# Patient Record
Sex: FEMALE | Race: WHITE | Employment: OTHER | URBAN - METROPOLITAN AREA
[De-identification: names, ages, dates, MRNs, and addresses within clinical notes are randomized per-mention and may not be internally consistent; named-entity substitution may affect disease eponyms.]

---

## 2017-04-26 ENCOUNTER — OFFICE VISIT (OUTPATIENT)
Dept: ORTHOPEDIC SURGERY | Age: 62
End: 2017-04-26

## 2017-04-26 VITALS
SYSTOLIC BLOOD PRESSURE: 121 MMHG | HEIGHT: 64 IN | HEART RATE: 77 BPM | DIASTOLIC BLOOD PRESSURE: 65 MMHG | WEIGHT: 145 LBS | BODY MASS INDEX: 24.75 KG/M2

## 2017-04-26 DIAGNOSIS — M76.822 TIBIAL TENDONITIS, POSTERIOR, LEFT: Primary | ICD-10-CM

## 2017-04-26 DIAGNOSIS — M79.672 CHRONIC PAIN OF LEFT HEEL: ICD-10-CM

## 2017-04-26 DIAGNOSIS — M79.671 PAIN IN BOTH FEET: ICD-10-CM

## 2017-04-26 DIAGNOSIS — G89.29 CHRONIC PAIN OF LEFT HEEL: ICD-10-CM

## 2017-04-26 DIAGNOSIS — M79.672 PAIN IN BOTH FEET: ICD-10-CM

## 2017-04-26 RX ORDER — ESCITALOPRAM OXALATE 10 MG/1
10 TABLET ORAL DAILY
COMMUNITY

## 2017-04-26 RX ORDER — DRONABINOL 5 MG/1
5 CAPSULE ORAL 2 TIMES DAILY
COMMUNITY

## 2017-04-26 RX ORDER — BUDESONIDE 3 MG/1
CAPSULE, COATED PELLETS ORAL
COMMUNITY
Start: 2017-03-02

## 2017-04-26 NOTE — PROGRESS NOTES
AMBULATORY PROGRESS NOTE    Patient: Bethany Michele             MRN: [id number]     SSN: [id number] There is no height or weight on file to calculate BMI. YOB: 1955     AGE: 63 y.o. EX: female    PCP: Mayi Mitchell MD    IMPRESSION/DIAGNOSIS AND TREATMENT PLAN     DIAGNOSES  No diagnosis found. No orders of the defined types were placed in this encounter. Bethany Michele understands her diagnoses and the proposed plan. Plan:    1) ***  2) ***    RTO - ***    HPI AND EXAMINATION     Gisela Eubanks IS A 63 y.o. female who presents to my outpatient office complaining of: left foot pain. At last visit, I recommend that she continue activities as tolerated. ***    She was previously seen s/p reconstruction on her left hindfoot. Her chief complaint today is:     1. Some soreness to the anteromedial portion of her right ankle. 2. Some generalized soreness to the plantar medial portion of her left hindfoot.      After seeing her today, my impression is:     1. Anteromedial, right focal discomfort along the medial malleolus of her right ankle: Recommendation is to follow up in the next few weeks. X-rays of her right ankle should her symptoms worsen. I did recommend an anti-inflammatory, Voltaren Cream, to be applied in this one focal region on the right side. 2. Left central heel pain, plantar heel discomfort: Recommendation is to continue the orthotic and activity modification. She cannot take anti-inflammatories at this time.      On examination, the patient is alert and follows commands. She is in no acute distress. Her affect and mood are appropriate. The right foot and ankle areas are examined. She has some focal tenderness to the anteromedial portion of the medial malleolus near the gutter. There is no effusion, no redness, no erythema, and no instability of her ankle. Excellent peripheral pulses are noted. She walks without a limp at this current time.  The discomfort she has is intermittent along the anteromedial portion of her ankle, medial gutter, when she plantarflexes and everts her foot. (So, when she puts tension on her deltoid ligament, this is when she has some mild discomfort.)     Left foot: She has well-healed surgical scars medially and laterally and slight discomfort to the plantar medial portion of her hindfoot along the medial calcaneal wall, not at the plantar fascial origin. Otherwise, no other regions of point tenderness, no deformities, and good peripheral pulses are noted. She has well-healed surgical scars. CHART REVIEW     Past Medical History:   Diagnosis Date    Appetite loss     Cancer [facility]) 2001    Hx of Right breast cancer    Chronic pain     Timo legs    Insomnia     Left foot pain Oct 2013    left flexor hallucis longus tendonitits    Migraines     Nausea     Osteopenia     Other ill-defined conditions(799.89)     very anxious    Posterior tibial tendonitis 2007    left, with interstitial tear; s/p repair     Current Outpatient Prescriptions   Medication Sig    amitriptyline (ELAVIL) 25 mg tablet Take 1 Tab by mouth daily as needed.  diclofenac (VOLTAREN) 1 % topical gel Apply 4 g to affected area four (4) times daily.  SUMAtriptan (IMITREX) 50 mg tablet Take 50 mg by mouth once as needed. Indications: MIGRAINE    multivitamin (ONE A DAY) tablet Take 1 Tab by mouth daily.  B.infantis-B.ani-B.long-B.bifi (PROBIOTIC 4X) 10-15 mg Tab Take  by mouth.  cyanocobalamin (VITAMIN B-12) 1,000 mcg Subl 3,000 mcg by SubLINGual route.  Cholecalciferol, Vitamin D3, (VITAMIN D3) 1,000 unit cap Take 1,000 Units by mouth daily.  raloxifene (EVISTA) 60 mg tablet Take 60 mg by mouth daily. Indications: POST-MENOPAUSAL OSTEOPOROSIS PREVENTION    melatonin 3 mg tablet Take 3 mg by mouth nightly. No current facility-administered medications for this visit.       Allergies   Allergen Reactions    Codeine Swelling    Percocet [Oxycodone-Acetaminophen] Swelling    Sulfa (Sulfonamide Antibiotics) Nausea Only    Ultram [Tramadol] Nausea and Vomiting    Vicodin [Hydrocodone-Acetaminophen] Swelling     Past Surgical History:   Procedure Laterality Date    BREAST SURGERY PROCEDURE UNLISTED      Right partial mastectomy    HX CHOLECYSTECTOMY  6/2012    HX COLONOSCOPY      multiple    HX OOPHORECTOMY      HX ORTHOPAEDIC Left Aug 2007    posterior tibial tendon debridement and repair, calcaneal osteotomy    HX ORTHOPAEDIC  June 2008    removal of hardware, left heel     Social History     Occupational History    Not on file. Social History Main Topics    Smoking status: Never Smoker    Smokeless tobacco: Never Used    Alcohol use No      Comment: rarely    Drug use: No    Sexual activity: Not on file     Family History   Problem Relation Age of Onset    Arthritis-osteo Mother     Hypertension Mother      REVIEW OF SYSTEMS : 4/26/2017  ALL BELOW ARE Negative except : SEE HPI       Constitutional: Negative for fever, chills and weight loss. Neg Weigh Loss  Cardiovascular: Negative for chest pain, claudication and leg swelling. SOB, DING   Gastrointestinal: Negative for  pain, N/V/D/C, Blood in stool or urine,dysuria, hematuria,        Incontinence, pelvic pain  Musculoskeletal: see HPI. Neurological: Negative for dizziness and weakness. Negative for headaches,Visual Changes, Confusion, Seizures,   Psychiatric/Behavioral: Negative for depression, memory loss and substance abuse. Extremities:  Negative for  hair changes, rash or skin lesion changes. Hematologic: Negative for Bleeding problems, bruising, pallor or swollen lymph nodes.   Peripheral Vascular: No calf pain, vascular vein tenderness to calf pain              No calf throbbing, posterior knee throbbing pain    DIAGNOSTIC IMAGING     XR Results (most recent):    Results from Hospital Encounter encounter on 10/04/16   XR CHEST PA LAT   Narrative Chest PA and lateral views    CPT CODE: 56424    HISTORY:Breast cancer     COMPARISON: None. FINDINGS: The heart is normal in size. The lungs are clear. The bilateral  costophrenic angles are sharp. The mediastinum, pulmonary vascularity and bony  thorax appear unremarkable. Surgical staples noted in the right breast area. Impression IMPRESSION:    No acute cardiopulmonary process.     Thank you for your referral.        Scribed by Marcos Lira, as dictated by Dayana Pérez MD.

## 2017-04-26 NOTE — MR AVS SNAPSHOT
Visit Information Date & Time Provider Department Dept. Phone Encounter #  
 4/26/2017  4:00 PM Shashileidy Woodsoy, [address] [facility] [id number] [phone] Follow-up Instructions Return in about 6 weeks (around 6/7/2017) for follow up evaluation. Upcoming Health Maintenance Date Due Hepatitis C Screening 1955 Pneumococcal 19-64 Highest Risk (1 of 3 - PCV13) 4/30/1976 DTaP/Tdap/Td series (1 - Tdap) 4/30/1978 BREAST CANCER SCRN MAMMOGRAM 4/30/2007 FOBT Q 1 YEAR AGE 50-75 4/30/2007 INFLUENZA AGE 9 TO ADULT 8/1/2016 PAP AKA CERVICAL CYTOLOGY 7/1/2017 Allergies as of 4/26/2017  Review Complete On: 4/26/2017 By: Asuncion Bailey PA-C Severity Noted Reaction Type Reactions Codeine  10/18/2012    Swelling Percocet [Oxycodone-acetaminophen]  10/18/2012    Swelling Sulfa (Sulfonamide Antibiotics)    Nausea Only Ultram [Tramadol]  10/18/2012    Nausea and Vomiting Vicodin [Hydrocodone-acetaminophen]  10/18/2012    Swelling Current Immunizations  Never Reviewed No immunizations on file. Not reviewed this visit You Were Diagnosed With   
  
 Codes Comments Plantar fasciitis    -  Primary ICD-10-CM: M72.2 ICD-9-CM: 728.71 Tibial tendonitis, posterior, left     ICD-10-CM: Y54.875 ICD-9-CM: 726.72 Vitals BP Pulse Height(growth percentile) Weight(growth percentile) LMP BMI  
 121/65 77 5' 4\" (1.626 m) 145 lb (65.8 kg) 04/18/2002 24.89 kg/m2 OB Status Smoking Status Postmenopausal Never Smoker Vitals History BMI and BSA Data Body Mass Index Body Surface Area  
 24.89 kg/m 2 1.72 m 2 Preferred Pharmacy Pharmacy Name Phone [facility] Renaldo Muniz, [facility] [place], [place] [address] [phone] Your Updated Medication List  
  
   
This list is accurate as of: 4/26/17  5:01 PM.  Always use your most recent med list.  
  
  
  
 amitriptyline 25 mg tablet Commonly known as:  ELAVIL Take 1 Tab by mouth daily as needed. budesonide 3 mg capsule Commonly known as:  ENTOCORT EC  
  
 diclofenac 1 % Gel Commonly known as:  VOLTAREN Apply 4 g to affected area four (4) times daily. dronabinol 5 mg capsule Commonly known as:  Tim Alpha Take 5 mg by mouth two (2) times a day. EVISTA 60 mg tablet Generic drug:  raloxifene Take 60 mg by mouth daily. Indications: POST-MENOPAUSAL OSTEOPOROSIS PREVENTION  
  
 IMITREX 50 mg tablet Generic drug:  SUMAtriptan Take 50 mg by mouth once as needed. Indications: MIGRAINE  
  
 LEXAPRO 10 mg tablet Generic drug:  escitalopram oxalate Take 10 mg by mouth daily. melatonin 3 mg tablet Take 3 mg by mouth nightly. multivitamin tablet Commonly known as:  ONE A DAY Take 1 Tab by mouth daily. PROBIOTIC 4X 10-15 mg Tbec Generic drug:  B.infantis-B.ani-B.long-B.bifi Take  by mouth. VITAMIN B-12 1,000 mcg sublingual tablet Generic drug:  cyanocobalamin  
3,000 mcg by SubLINGual route. VITAMIN D3 1,000 unit Cap Generic drug:  cholecalciferol Take 1,000 Units by mouth daily. We Performed the Following AMB SUPPLY ORDER [[id number] Custom] Comments:  
 4/26/2017 
4:47 PM 
 
Custom trilam with met pad- bilateral 
Offload central heel- left Follow-up Instructions Return in about 6 weeks (around 6/7/2017) for follow up evaluation. Please provide this summary of care documentation to your next provider. Your primary care clinician is listed as Donna Figueroa. If you have any questions after today's visit, please call [phone].

## 2017-04-26 NOTE — PROGRESS NOTES
Patient: Jermaine Syed                MRN: [id number]       SSN: [id number]  YOB: 1955               AGE: 63 y.o. SEX: female    Anita Ferguson MD      Chief Complaint:   Chief Complaint   Patient presents with    Foot Pain     Left         HPI:     Patient is a 63 y.o. female who presents today for follow up evaluation of bilateral foot pain. Patient states she has right forefoot neuropathy and denies a history of DM. She has a history of left posterior tibial reconstruction and calcaneal osteotomy and left central heel pain. She has been wearing orthotics (with met pad) that she received from [place] [id number] and would like an order for a new pair, but she did not like the black material used by [facility] as she states it causes a bad odor. Patient was last seen in the office by Dr. Ana Paula Flores on 1/5/16 and was instructed to continue activity modification, use Voltaren gel as directed as needed and follow up in 4 weeks. PHYSICAL EXAM:       Visit Vitals    /65    Pulse 77    Ht 5' 4\" (1.626 m)    Wt 145 lb (65.8 kg)    LMP 04/18/2002    BMI 24.89 kg/m2       GEN:  Alert, well developed, well nourished, well appearing 63 y.o. female in no acute distress. PSYCH:  Normal affect, mood, and conduct. alert, oriented x 3 alert, oriented x 3, no dementia  M/S EXAMINATION OF: left foot/ankle and right foot/ankle  SKIN: no edema, no erythema, no ecchymosis, no warmth. Left foot:  She has well-healed surgical scars medially and laterally and slight discomfort to the plantar medial portion of her hindfoot along the medial calcaneal wall, not at the plantar fascial origin. Otherwise, no other regions of point tenderness, no deformities, and good peripheral pulses are noted. She has well-healed surgical scars. TENDERNESS:  no tenderness to palpation   NEUROVASCULAR:  grossly intact. Positive distal pulses and capillary refill.    DVT ASSESSMENT:  The calf is not tender to palpation. No evidence of DVT seen on physical exam.  ROM:  Within normal limits with exception of toe flexion which is decreased      IMPRESSION:       Encounter Diagnoses     ICD-10-CM ICD-9-CM   1. Tibial tendonitis, posterior, left M76.822 726.72   2. Pain in both feet M79.671 729.5    M79.672    3. Chronic pain of left heel M79.672 729.5    G89.29 338.29       PLAN:         Orders Placed This Encounter    Generic Supply Order             · Continue activity as tolerated  · Order provided for new custom orthotics  · Follow up in 6 weeks or sooner as needed  · X-[facility] at [facility]      Patient has been discussed with Dr. Rosie Morales during this visit and he agrees with the assessment and plan. Patient understands treatment plan and has been provided with patient education. PAST MEDICAL HISTORY:     Past Medical History:   Diagnosis Date    Appetite loss     Cancer [facility]) 2001    Hx of Right breast cancer    Chronic pain     Timo legs    Insomnia     Left foot pain Oct 2013    left flexor hallucis longus tendonitits    Migraines     Nausea     Osteopenia     Other ill-defined conditions     very anxious    Posterior tibial tendonitis 2007    left, with interstitial tear; s/p repair       MEDICATIONS:     Current Outpatient Prescriptions   Medication Sig    escitalopram oxalate (LEXAPRO) 10 mg tablet Take 10 mg by mouth daily.  budesonide (ENTOCORT EC) 3 mg capsule     dronabinol (MARINOL) 5 mg capsule Take 5 mg by mouth two (2) times a day.  amitriptyline (ELAVIL) 25 mg tablet Take 1 Tab by mouth daily as needed.  diclofenac (VOLTAREN) 1 % topical gel Apply 4 g to affected area four (4) times daily.  SUMAtriptan (IMITREX) 50 mg tablet Take 50 mg by mouth once as needed. Indications: MIGRAINE    multivitamin (ONE A DAY) tablet Take 1 Tab by mouth daily.  cyanocobalamin (VITAMIN B-12) 1,000 mcg Subl 3,000 mcg by SubLINGual route.       Cholecalciferol, Vitamin D3, (VITAMIN D3) 1,000 unit cap Take 1,000 Units by mouth daily.  raloxifene (EVISTA) 60 mg tablet Take 60 mg by mouth daily. Indications: POST-MENOPAUSAL OSTEOPOROSIS PREVENTION    melatonin 3 mg tablet Take 3 mg by mouth nightly.  B.infantis-B.ani-B.long-B.bifi (PROBIOTIC 4X) 10-15 mg Tab Take  by mouth. No current facility-administered medications for this visit. ALLERGIES:     Allergies   Allergen Reactions    Codeine Swelling    Percocet [Oxycodone-Acetaminophen] Swelling    Sulfa (Sulfonamide Antibiotics) Nausea Only    Ultram [Tramadol] Nausea and Vomiting    Vicodin [Hydrocodone-Acetaminophen] Swelling         PAST SURGICAL HISTORY:     Past Surgical History:   Procedure Laterality Date    BREAST SURGERY PROCEDURE UNLISTED      Right partial mastectomy    HX CHOLECYSTECTOMY  6/2012    HX COLONOSCOPY      multiple    HX OOPHORECTOMY      HX ORTHOPAEDIC Left Aug 2007    posterior tibial tendon debridement and repair, calcaneal osteotomy    HX ORTHOPAEDIC  June 2008    removal of hardware, left heel       SOCIAL HISTORY:     Social History     Social History    Marital status: SINGLE     Spouse name: N/A    Number of children: N/A    Years of education: N/A     Occupational History    Not on file. Social History Main Topics    Smoking status: Never Smoker    Smokeless tobacco: Never Used    Alcohol use No      Comment: rarely    Drug use: No    Sexual activity: Not on file     Other Topics Concern    Not on file     Social History Narrative       FAMILY HISTORY:     Family History   Problem Relation Age of Onset    Arthritis-osteo Mother     Hypertension Mother          REVIEW OF SYSTEMS:     Otherwise as noted in HPI     REVIEW OF SYSTEMS: All Below are Negative except: See HPI      RADIOGRAPHS & DIAGNOSTIC STUDIES     No results found for any visits on 04/26/17.       Yung Collado PA-C  4/26/2017

## 2017-05-23 ENCOUNTER — TELEPHONE (OUTPATIENT)
Dept: ORTHOPEDIC SURGERY | Age: 62
End: 2017-05-23

## 2017-05-23 NOTE — TELEPHONE ENCOUNTER
The patient states [facility] will not accept the prescription for an orthotic with Evan Benavides PA-C signature. They require a new prescription to be faxed to their facility with Dr. Ezra Laguerre signature. Please advise.     [address] [address] [address]  (D) [phone]  (P) [phone]

## 2017-05-24 NOTE — TELEPHONE ENCOUNTER
New order entered with Dr. Garry Walter as the provider.   Order faxed to [facility] at [phone]

## 2017-06-19 ENCOUNTER — HOSPITAL ENCOUNTER (OUTPATIENT)
Dept: NUTRITION | Age: 62
Discharge: HOME | End: 2017-06-19
Payer: COMMERCIAL

## 2017-06-19 PROCEDURE — 97802 MEDICAL NUTRITION INDIV IN: CPT

## 2017-06-19 NOTE — PROGRESS NOTES
[address] [facility]  [address], [place], [address] - Phone: [phone]     Nutrition Assessment - Medical Nutrition Therapy   Outpatient  Initial Evaluation         Patient Name: Sofiya Edge : 1955   Treatment Diagnosis: Sucrose malabsorption Onset Date:    Referral Source: Melvin Whitehead MD [place] [facility]): 2017     Ht:   in  cm Wt:   lb  kg   BMI:   BMR male    BMR female      Diagnosis: See Above        Medications of Nutritional Significance:   Marinol, budesonide, colestipol. Elviil, Lexapro, evista, bentyl     Subjective/Assessment: Pt is a 62 yo female who has a history of breast CA, Ulcerative Colitis, and has developed a sucrose malabsorption. Pt has a limited appetite and is on an appetite stimulant (Marinol). Pt is a former  and is now retired. She is allergic to seafood as well to include fish and does not eat red meat or soy. Pt has active symptoms and is not sure where to start to get relief. Discussed and revied the low/no sucrose diet as well as the FODMAP. Decided to start with the Low/no Sucrose diet and will follow that for 6 weeks and then begin reintroducing foods back into diet. Discussed the purpose of elimination diets and the benefits from them. Discussed that pt maintains a food log so she has reference to what she eats to determine foods that elicit a negative or positive response. Provided her with recipes, and snack ideas as well as stores to find certain products. Pt will come back in ~6 weeks for follow up and to determine level of relief of symptoms. She is very motivated and willing to try anything to not feel as restricted as she currently does. Pt expressed comprehension, high motivation, and compliance is expected.        Current Eating Patterns:  0830: fiber bar w/ coffee /2 & /2 and splenda  1400: 2 tangerines, grapes, grits and slice of soto  0616: chix salad and jovana  1900: baked chix, steamed osvaldo, rice     Handouts/  Information Provided: []  Carbohydrates  []  Protein  []  Fiber  []  Serving Sizes  []  Meal and Snack Ideas  []  Food Journals []  Diabetes  []  Cholesterol  []  Sodium  []  Gen Nutr Guidelines  []  SBGM Guidelines  [x]  Others: no sucrose diet, FODMAP, recipes,    Estimate Needs:   Calories:  N/A Protein: 0 Carbs: 0 Fat: 0   Kcal/day  g/day  g/day  g/day         percent: 38 Percent: 30 Percent: 32     Nutritional Goal - To promote lifestyle changes to result in:    []  weight loss  []  Improved diabetic control  []  Decreased cholesterol levels  []  Decreased blood pressure  []  weight maintenance [x]  Preventing any interactions associated with food allergies  []  Adequate weight gain toward goal weight  []  Other:          Recommendations: 1. Eat 3 meals per day w/ 1-2 snacks  2. Follow a low/no sucrose diet for 6 week; then slowly re-introduce foods  3.  Maintain a food log to reference trigger and non-trigger foods     Information Reviewed with: patient   Potential Barriers to Learning: None     Dietitian Signature: Yung Schultz MA RD Date: 6/19/2017   Follow-up: 7-28-17 @ 1300 Time: 2:30 PM

## 2017-07-28 ENCOUNTER — HOSPITAL ENCOUNTER (OUTPATIENT)
Dept: NUTRITION | Age: 62
Discharge: HOME | End: 2017-07-28
Payer: COMMERCIAL

## 2017-07-28 PROCEDURE — 97803 MED NUTRITION INDIV SUBSEQ: CPT

## 2017-07-28 NOTE — PROGRESS NOTES
NUTRITION - DAILY TREATMENT NOTE  Patient Name: Alie Ceballos         Date: 2017  : 1955    YES Patient  Verified  Insurance: Payor: BLUE CROSS / Plan: [place] / Product Type: PPO /    Diagnosis: In time:   1300             Out time:   1330   Total Treatment Time (min):   30     SUBJECTIVE    Medication Changes/New allergies or changes in medical history, any new surgeries or procedures? NO    If yes, update Summary List   Subjective Functional Status/Changes:  []  No changes reported   Pt has been doing very well with following low/no sucrose diet. She has been very compliant and has eliminated all foods that contain sucrose. Pt has also started tracking her food intake on the [employer] hung and noticed she does not consume enough protein. She says she has not has any constipation, bloating, or cramping since being on diet, she feels much better is cancelling plans significantly less often. She did have 2 episodes of cramping since her last visit, but she was able to rule uout the cause and avoid those foods. Will hold of on the FODMAP diet at this time since pt is having success with the low/no sucrose diet. Pt will begin reintroducing foods next week and she understands the process and that is can be lengthy. She will maintain a journal of foods she tolerates, moderately tolerates, and must avoid. Pt expressed comprehension, high motivation, and compliance is expected.    Current Wt: 142 Previous Wt:  Wt Change:      OBJECTIVE    Patient Education:  [x]  Review current plan with patient   []  Other:    Handouts/  Information Provided: []  Carbohydrates  []  Protein  []  Fiber  []  Serving Sizes  []  Fluids  []  General guidelines []  Diabetes  []  Cholesterol  []  Sodium  []  SBGM  []  Food Journals  []  Others:    Estimated Needs: [phone]    Calories Protein CHO Fat     ASSESSMENT    [x]  Pt Progressing Well  []  Slow Progress []  No Progress    Other:    [x] Understand Dietary Changes/Recs []  No Change [x]  Improving []  N/A   []  Weight []  No Change []  Improving []  N/A     Glucose Levels []  No Change []  Improving []  N/A   []  Cholesterol Levels []  No Change []  Improving []  N/A     RECOMMENDATIONS    1. Start reintroducing foods on 8/1, start with one food at a time and only do one food per week   2. Create food lists based off tolerance of reintroduced foods   3. Increase protein intake, space your eating by 3 hours,    4.    5.       PLAN    [x]  Continue on current plan []  Follow-up PRN   []  Discharge due to :    [x]  Next Appt[de-identified] 9-13-17 @ 3212     Dietitian: Yayo Baez MA, RD    Date: 7/28/2017 Time: 12:44 PM

## 2017-09-29 ENCOUNTER — HOSPITAL ENCOUNTER (OUTPATIENT)
Dept: NUTRITION | Age: 62
Discharge: HOME | End: 2017-09-29
Payer: COMMERCIAL

## 2017-09-29 PROCEDURE — 97803 MED NUTRITION INDIV SUBSEQ: CPT

## 2017-09-29 NOTE — PROGRESS NOTES
NUTRITION - DAILY TREATMENT NOTE  Patient Name: Lodema Schwab         Date: 2017  : 1955    YES Patient  Verified  Insurance: Payor: BLUE CROSS / Plan: [place] [employer] / Product Type: PPO /    Diagnosis: In time:   1330             Out time:   1400   Total Treatment Time (min):   30     SUBJECTIVE    Medication Changes/New allergies or changes in medical history, any new surgeries or procedures? NO    If yes, update Summary List   Subjective Functional Status/Changes:  []  No changes reported   Pt has been doing great. She says she feels great and has no to very little symptoms at all. She sometimes has a little diarrhea, but attributes that to possible indulgence. She asked very insightful questions regarding increased bruising (most likely from budesonide), what labs she should have checked next month, other names for Sucrose, and what she should stay away from and what she can eat. Discussed that she should stick to what is working since she has resolution of her symptoms and to be cautious of foods she is unsure of, but it is okay to try them to richar her tolerance, so long as they do not contain sucrose. Pt has been eating clean with not added sugar foods. She is not drinking any carbonated beverages either. She is hoping to be able to travel more since she feels she has better control and understanding of her diet. Pt is very compliant and a pleasure to work with. Will continue to follow as long as she see benefit and would like.     Current Wt: 137.4 Previous Wt: 142 Wt Change: -4.6     OBJECTIVE    Patient Education:  [x]  Review current plan with patient   []  Other:    Handouts/  Information Provided: []  Carbohydrates  []  Protein  []  Fiber  []  Serving Sizes  []  Fluids  []  General guidelines []  Diabetes  []  Cholesterol  []  Sodium  []  SBGM  []  Food Journals  []  Others:    Estimated Needs: [phone]    Calories Protein CHO Fat     ASSESSMENT    [x]  Pt Progressing Well  []  Slow Progress []  No Progress    Other:    [x]  Understand Dietary Changes/Recs []  No Change [x]  Improving []  N/A   [x]  Weight []  No Change [x]  Improving []  N/A     Glucose Levels []  No Change []  Improving []  N/A   []  Cholesterol Levels []  No Change []  Improving []  N/A     RECOMMENDATIONS    1. Increase your protein intake; get 3-5 oz with meals and 1-2 oz with snacks (can also use protein powder)   2. Get more exercsie on non-stas days, exercise videos, other gym classes, etc   3. Flash intake of questionable foods by how you feel    4.    5.       PLAN    [x]  Continue on current plan []  Follow-up PRN   []  Discharge due to :    [x]  Next Appt[de-identified] 13 Nov @ [id number] Jevon Pope     Dietitian: Nara Nowak MA, RD    Date: 9/29/2017 Time: 1:17 PM

## 2017-11-13 ENCOUNTER — HOSPITAL ENCOUNTER (OUTPATIENT)
Dept: NUTRITION | Age: 62
Discharge: HOME | End: 2017-11-13
Payer: COMMERCIAL

## 2017-11-13 PROCEDURE — 97803 MED NUTRITION INDIV SUBSEQ: CPT

## 2017-11-13 NOTE — PROGRESS NOTES
NUTRITION - FOLLOW-UP TREATMENT NOTE  Patient Name: Blair Bagley         Date: 2017  : 1955    YES Patient  Verified  Diagnosis: Sucrose Malabsorption   In time:   1100             Out time:   1200   Total Treatment Time (min):   60     SUBJECTIVE/ASSESSMENT    Changes in medication or medical history? Any new allergies, surgeries or procedures? NO    If yes, update Summary List   Pt has been doing great with lifestyle changes. She has had very minimal side effects, some cramping or bloating and a loose stool here and there. Discussed how much sugar she should have each day, explained that like any elimination diet she will have to test her limits by slowly increasing amount she consumes. Provided parameters to stay within in order to not elicit a full on flare up. Answered all other questions the Pt had, alternative ingredients, exercise resources, also discussed ensuring she eats enough calories each day. Reinforced not consuming a lot of multi-blended foods (foods with multiple ingredients) in order to determine what food is causing a reaction. Pt will try whey protein with just water to ensure she has not tolerance issues, if so discussed vegan (non-whey protein options). Will continue to follow up with Pt at [facility] location. Current Wt: 134 Previous Wt: 137.4 Wt Change: -3.4     Achievement of Goals: 1. Increase your protein intake; get 3-5 oz with meals and 1-2 oz with snacks (can also use protein powder)  =  continue   2. Get more exercsie on non-stas days, exercise videos, other gym classes, etc = Met, continue   3.  Lisa Starling intake of questionable foods by how you feel  = Met, continue            Patient Education:  [x]  Review current plan with patient   []  Other:    Handouts/  Information Provided: []  Carbohydrates  []  Protein  []  Fiber  []  Serving Sizes  []  Fluids  []  General guidelines []  Diabetes  []  Cholesterol  []  Sodium  []  SBGM  [] Food Journals  []  Others: Alternative ingredients     New Patient Goals: - Start testing foods for tolerance level; minor gas is okay, cramping is not  - Try whey protein mixed with water to determine tolerance.  If no issues try premier protein shakes  - Make sure you are eating enough calories, do not go below 1200 daily     PLAN    [x]  Continue on current plan []  Follow-up PRN   []  Discharge due to :    [x]  Next appt: 19 Feb 2018 @ 21  @ [facility]     Dietitian: Ale Cerda, [email or web address]    Date: 11/13/2017 Time: 10:57 AM

## 2018-02-19 ENCOUNTER — HOSPITAL ENCOUNTER (OUTPATIENT)
Dept: NUTRITION | Age: 63
Discharge: HOME | End: 2018-02-19
Payer: COMMERCIAL

## 2018-02-19 PROCEDURE — 97803 MED NUTRITION INDIV SUBSEQ: CPT

## 2018-02-19 NOTE — PROGRESS NOTES
NUTRITION - FOLLOW-UP TREATMENT NOTE  Patient Name: Pablo Jones         Date: 2018  : 1955    YES Patient  Verified  Diagnosis: Sucrose malabsorption   In time:   1030             Out time:   1100   Total Treatment Time (min):   30     SUBJECTIVE/ASSESSMENT    Changes in medication or medical history? Any new allergies, surgeries or procedures? NO    If yes, update Summary List   Pt has been doing very good since last visit. She is stable and has learned the foods she needs to eliminate and those that she can have in small amounts. She has been following a clean eating style diet and ensuring she does not exceed 8 gm of sugar per day. She has slowly started trying different foods to determine tolerance and has good success. She is very aware of what works and what does not. She was able to eliminate Omega-3 supplement due to her reactions (loose stools). She is also very happy with her eating habits, as well as her energy level. She does eat small portions and needs to eat more frequently in order to maintain her weight. She has lost a total of 14# since starting to be seen in  of last year and discussed maintaining current weight and not going lower than 125#. She does not feel lethargic or malnourished so weight loss is most likely due to GI issues and the resolution, anticipate weight should taper and remain steady in 120's. She continues to do [place] 2x/week, she has been doing well on reduced Budesonide dose. Discussed more products she can try as well as reinforced her need for increased protein and calories. Pt completely understands and remains very motivated to maintain lifestyle changes. Will continue to follow post maternity leave. Current Wt: 128 Previous Wt: 134 Wt Change: -6     Achievement of Goals: - Start testing foods for tolerance level; minor gas is okay, cramping is not= Met, continue  - Try whey protein mixed with water to determine tolerance.  If no issues try premier protein shakes= Met, revised below  - Make sure you are eating enough calories, do not go below 1200 daily = Not met, revised below         Patient Education:  [x]  Review current plan with patient   []  Other:    Handouts/  Information Provided: []  Carbohydrates  []  Protein  []  Fiber  []  Serving Sizes  []  Fluids  []  General guidelines []  Diabetes  []  Cholesterol  []  Sodium  []  SBGM  []  Food Journals  []  Others:      New Patient Goals: - Start Incorporating one protein drink per day into your diet, dr. Kang Duarte or other protein mix  -Continue shifting focus on protein, try to get it from food sources as much as possible  -When you have small, snack size meals make sure you are eating every 2 hours  -calorie goal is no less than [id number] philip     PLAN    [x]  Continue on current plan []  Follow-up PRN   []  Discharge due to :    [x]  Next appt: 20 June @ [address] [place] [address]     Dietitian: Isai Lim MA, RD    Date: 2/19/2018 Time: 10:01 AM

## 2018-03-10 ENCOUNTER — HOSPITAL ENCOUNTER (OUTPATIENT)
Dept: ULTRASOUND IMAGING | Age: 63
Discharge: HOME | End: 2018-03-10
Attending: FAMILY MEDICINE
Payer: COMMERCIAL

## 2018-03-10 DIAGNOSIS — R10.31 RLQ ABDOMINAL PAIN: ICD-10-CM

## 2018-03-10 PROCEDURE — 76705 ECHO EXAM OF ABDOMEN: CPT

## 2018-03-27 ENCOUNTER — HOSPITAL ENCOUNTER (OUTPATIENT)
Dept: CT IMAGING | Age: 63
Discharge: HOME | End: 2018-03-27
Attending: NURSE PRACTITIONER
Payer: COMMERCIAL

## 2018-03-27 DIAGNOSIS — N20.0 KIDNEY STONE: ICD-10-CM

## 2018-03-27 PROCEDURE — 74176 CT ABD & PELVIS W/O CONTRAST: CPT

## 2018-03-28 NOTE — PROGRESS NOTES
Call and spoke with patient, reviewed results with her. She is aware of the bilateral kidney stones. They are not causing any problems at this time. Patient will follow up with her GI doctor.

## 2018-04-10 ENCOUNTER — HOSPITAL ENCOUNTER (OUTPATIENT)
Dept: GENERAL RADIOLOGY | Age: 63
Discharge: HOME | End: 2018-04-10
Payer: COMMERCIAL

## 2018-04-10 DIAGNOSIS — M25.551 RIGHT HIP PAIN: ICD-10-CM

## 2018-04-10 PROCEDURE — 73502 X-RAY EXAM HIP UNI 2-3 VIEWS: CPT

## 2018-06-20 ENCOUNTER — HOSPITAL ENCOUNTER (OUTPATIENT)
Dept: NUTRITION | Age: 63
Discharge: HOME | End: 2018-06-20
Payer: COMMERCIAL

## 2018-06-20 PROCEDURE — 97803 MED NUTRITION INDIV SUBSEQ: CPT

## 2018-06-20 NOTE — PROGRESS NOTES
NUTRITION - FOLLOW-UP TREATMENT NOTE  Patient Name: Kamaljit Altamirano         Date: 2018  : 1955    YES Patient  Verified  Diagnosis: Sucrose malabsorption   In time:   1300             Out time:   1330   Total Treatment Time (min):   30     SUBJECTIVE/ASSESSMENT    Changes in medication or medical history? Any new allergies, surgeries or procedures?    /NO    If yes, update Summary List   Pt has been okay since last visit in February. She has taken liberties with her diet and has learned that she may need to remain on sugar free (sucrose) for life. She is okay with this. She also notes that she has had no appetite for 6 weeks, could be attributed to the heat and possible acceptance of dietary lifestyle. She continues appetite stimulant 3-4 times per day and it takes the edge off to get her to eat. Anticipate she is consuming less than 100 calories per day. She also admits she stopped tracking her food intake. Discussed restarting her food log,and making sure she gets no less than 1200 calories per day. Discussed using nutrient dense foods (healthy fats) because these require less portion with more calorie. Discussed increasing protein intake and various products she can try (Ripple milk) to mix with smoothies as well as different ways to prepare protein foods so she doesn't get flavor fatigue. Also reiterated focusing on the positive of \"what can I have\" and not what she can't. Will continue to work with patient. She still remains motivated and positive.     Current Wt: 130.2 Previous Wt: 128 Wt Change: +2.2     Achievement of Goals: - Start Incorporating one protein drink per day into your diet, dr. Reina Jenkins or other protein mix-  = Not met, revised below  -Continue shifting focus on protein, try to get it from food sources as much as possible= Met, continue  -When you have small, snack size meals make sure you are eating every 2 hours= Met, continue  -calorie goal is no less than [id number] philip -  = Not met, revised below= Met, continue         Patient Education:  [x]  Review current plan with patient   []  Other:    Handouts/  Information Provided: []  Carbohydrates  []  Protein  []  Fiber  []  Serving Sizes  []  Fluids  []  General guidelines []  Diabetes  []  Cholesterol  []  Sodium  []  SBGM  []  Food Journals  []  Others:      New Patient Goals: - Get back to maintaining a food journal to guardado sure you are eating enough  - have no less than 1200 philip/day; keep sugar limits under control (4-6gm) don't push it  - Get enough protein prevents atrophy and look at increasing healthy fats for high calorie without overwhelming portions  - get back to having shakes (protein)     PLAN    [x]  Continue on current plan []  Follow-up PRN   []  Discharge due to :    [x]  Next appt: Aug 29 @ [facility]     Dietitian: Loren Milner MA, RD    Date: 6/20/2018 Time: 12:56 PM

## 2018-06-27 ENCOUNTER — OFFICE VISIT (OUTPATIENT)
Dept: ORTHOPEDIC SURGERY | Age: 63
End: 2018-06-27

## 2018-06-27 VITALS
RESPIRATION RATE: 16 BRPM | DIASTOLIC BLOOD PRESSURE: 70 MMHG | OXYGEN SATURATION: 93 % | HEIGHT: 64 IN | TEMPERATURE: 96.8 F | BODY MASS INDEX: 22.71 KG/M2 | SYSTOLIC BLOOD PRESSURE: 128 MMHG | HEART RATE: 85 BPM | WEIGHT: 133 LBS

## 2018-06-27 DIAGNOSIS — M19.072 ARTHRITIS OF LEFT FOOT: Primary | ICD-10-CM

## 2018-06-27 DIAGNOSIS — M76.822 TIBIAL TENDONITIS, POSTERIOR, LEFT: ICD-10-CM

## 2018-06-27 DIAGNOSIS — Z98.890 S/P TENDON REPAIR: ICD-10-CM

## 2018-06-27 RX ORDER — DICLOFENAC SODIUM 10 MG/G
GEL TOPICAL 2 TIMES DAILY
Qty: 100 G | Refills: 5 | Status: SHIPPED | OUTPATIENT
Start: 2018-06-27

## 2018-06-27 NOTE — PROGRESS NOTES
AMBULATORY PROGRESS NOTE      Patient: Velma Ochoa             MRN: [id number]     SSN: [id number] Body mass index is 22.83 kg/(m^2). YOB: 1955     AGE: 66 y.o. EX: female    PCP: Libby Naik MD    IMPRESSION/DIAGNOSIS AND TREATMENT PLAN     DIAGNOSES  1. Arthritis of left foot    2. S/P tendon repair    3. Tibial tendonitis, posterior, left        Orders Placed This Encounter    AMB SUPPLY ORDER    AMB SUPPLY ORDER    [89452] Foot Min 3V    diclofenac (VOLTAREN) 1 % gel      Velma Ochoa understands her diagnoses and the proposed plan. Plan:    1) DME Order: Custom Trilaminar orthotic inserts with medial posting. 2) Voltaren 1% gel: 4 g BID; 5 refills. RTO - 1 year    HPI AND EXAMINATION     Gisela Eubanks IS A 66 y.o. female who presents to my outpatient office for follow up evaluation of bilateral foot pain, and left posterior tibial tendinitis. At last visit, Yaron Crawford PA-C, recommended to continue activity as tolerated, and provided an order for new custom orthotics. Since we saw her last, Ms. Tessie Liu reports she has experienced left heel pain and AM left ankle pain. In the past, Ms. Tessie Liu had a posterior tibial tendon reconstruction. She has diminished sensation along the lateral aspect of her left ankle inferior to the lateral malleolus, but she has normal sensation of her lateral left foot. Of note, she still has diminished sensation along the fourth and fifth toes. Otherwise, she would like get new custom trilaminar inserts as she plans to go on a trip in the coming months. Additionally, she has felt clicking of her left knee. She denies any major pain or discomfort. Her main concern is to make sure that it is not something to be concerned of. 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 GEN:  Alert, well developed, well nourished, well appearing 63 y.o. female in no acute distress. PSYCH:  Normal affect, mood, and conduct.  alert, oriented x 3 alert, oriented x 3, no dementia  M/S EXAMINATION OF: left foot/ankle and right foot/ankle  SKIN: no edema, no erythema, no ecchymosis, no warmth. Left foot:  She has well-healed surgical scars medially and laterally and slight discomfort to the plantar medial portion of her hindfoot along the medial calcaneal wall, not at the plantar fascial origin. Otherwise, no other regions of point tenderness, no deformities, and good peripheral pulses are noted. She has well-healed surgical scars. TENDERNESS:  very slight tenderness to palpation at this time to reconstructed tib post region. NEUROVASCULAR:  grossly intact. Positive distal pulses and capillary refill. DVT ASSESSMENT:  The calf is not tender to palpation. No evidence of DVT seen on physical exam.  ROM:  Within normal limits with exception of toe flexion which is decreased    Knees:  Left       Cutaneous: Skin intact, no abrasions, blisters, wounds, erythema       Effusion: Is not present       Crepitus:  no PF joint crepitus       Tenderness: Tenderness: None        Alignment of Knee: neutral when standing       ROM: full range of motion       Fullness or swelling: None to popliteal fossa region       Stability: No instability to anterior, posterior, varus, valgus stress testing       Thrust: No varus thrust with gait       Neuro: Extensor mechanism is intact    CHART REVIEW     Past Medical History:   Diagnosis Date    Appetite loss     Cancer ([facility] [address] 2001    Hx of Right breast cancer    Chronic pain     Timo legs    Insomnia     Left foot pain Oct 2013    left flexor hallucis longus tendonitits    Migraines     Nausea     Osteopenia     Other ill-defined conditions(799.89)     very anxious    Posterior tibial tendonitis 2007    left, with interstitial tear; s/p repair     Current Outpatient Prescriptions   Medication Sig    LYSINE PO Take  by mouth.  diclofenac (VOLTAREN) 1 % gel Apply  to affected area two (2) times a day.  Apply to affected area as directed.  dicyclomine (BENTYL) 10 mg capsule     tobramycin-dexamethasone (TOBRADEX) ophthalmic suspension     escitalopram oxalate (LEXAPRO) 10 mg tablet Take 10 mg by mouth daily.  budesonide (ENTOCORT EC) 3 mg capsule     dronabinol (MARINOL) 5 mg capsule Take 5 mg by mouth two (2) times a day.  amitriptyline (ELAVIL) 25 mg tablet Take 1 Tab by mouth daily as needed.  diclofenac (VOLTAREN) 1 % topical gel Apply 4 g to affected area four (4) times daily.  SUMAtriptan (IMITREX) 50 mg tablet Take 50 mg by mouth once as needed. Indications: MIGRAINE    multivitamin (ONE A DAY) tablet Take 1 Tab by mouth daily.  cyanocobalamin (VITAMIN B-12) 1,000 mcg Subl 3,000 mcg by SubLINGual route.  Cholecalciferol, Vitamin D3, (VITAMIN D3) 1,000 unit cap Take 1,000 Units by mouth daily.  raloxifene (EVISTA) 60 mg tablet Take 60 mg by mouth daily. Indications: POST-MENOPAUSAL OSTEOPOROSIS PREVENTION    melatonin 3 mg tablet Take 3 mg by mouth nightly.  nitrofurantoin, macrocrystal-monohydrate, (MACROBID) 100 mg capsule     B.infantis-B.ani-B.long-B.bifi (PROBIOTIC 4X) 10-15 mg Tab Take  by mouth. No current facility-administered medications for this visit. Allergies   Allergen Reactions    Codeine Swelling    Percocet [Oxycodone-Acetaminophen] Swelling    Sulfa (Sulfonamide Antibiotics) Nausea Only    Ultram [Tramadol] Nausea and Vomiting    Vicodin [Hydrocodone-Acetaminophen] Swelling     Past Surgical History:   Procedure Laterality Date    BREAST SURGERY PROCEDURE UNLISTED      Right partial mastectomy    HX CHOLECYSTECTOMY  6/2012    HX COLONOSCOPY      multiple    HX OOPHORECTOMY      HX ORTHOPAEDIC Left Aug 2007    posterior tibial tendon debridement and repair, calcaneal osteotomy    HX ORTHOPAEDIC  June 2008    removal of hardware, left heel     Social History     Occupational History    Not on file.      Social History Main Topics    Smoking status: Never Smoker    Smokeless tobacco: Never Used    Alcohol use No      Comment: rarely    Drug use: No    Sexual activity: Not on file     Family History   Problem Relation Age of Onset    Arthritis-osteo Mother     Hypertension Mother        REVIEW OF SYSTEMS : 7/26/2018  ALL BELOW ARE Negative except : SEE HPI       Constitutional: Negative for fever, chills and weight loss. Neg Weigh Loss  Cardiovascular: Negative for chest pain, claudication and leg swelling. SOB, DING   Gastrointestinal: Negative for  pain, N/V/D/C, Blood in stool or urine,dysuria, hematuria,        Incontinence, pelvic pain  Musculoskeletal: see HPI. Neurological: Negative for dizziness and weakness. Negative for headaches,Visual Changes, Confusion, Seizures,   Psychiatric/Behavioral: Negative for depression, memory loss and substance abuse. Extremities:  Negative for  hair changes, rash or skin lesion changes. Hematologic: Negative for Bleeding problems, bruising, pallor or swollen lymph nodes. Peripheral Vascular: No calf pain, vascular vein tenderness to calf pain              No calf throbbing, posterior knee throbbing pain    DIAGNOSTIC IMAGING     FOOT X RAYS 3 VIEWS Left   7/26/2018    WEIGHT BEARING    X RAYS AT [address] [place] [address]  7/26/2018      Bones: No fractures or dislocations. No focal osteolytic or osteoblastic process     Bone Spurs: No significant bone spurs  Foot Alignment: Mild Pes Planus  Joint Condition:   MILD OA 1ST CC REGION// PRIOR CALCANEAL OSTEOTOMY OF CALCANEUS  Soft Tissues: Normal, No radiopaque foreign body and No abnormal calcific densities to soft tissues   No ankle joint effusion in lateral projection. Mineralization: Suggests  no Osteopenia    I have personally reviewed the results of the above study.  The interpretation of this study is my professional opinion    Written by Elis Flanagan, as dictated by Jaelyn Wise MD. I, , Jaelyn Wise MD, confirm that all documentation is accurate.

## 2018-06-27 NOTE — MR AVS SNAPSHOT
[address], [address] 200 [facility] 
[phone] Patient: Mervin Bain MRN: [id number] Visit Information Date & Time Provider Department Dept. Phone Encounter #  
 2018  3:45 PM Florentino Seip, 27 [facility] [phone] Upcoming Health Maintenance Date Due Hepatitis C Screening 1955 DTaP/Tdap/Td series (1 - Tdap) 1978 BREAST CANCER SCRN MAMMOGRAM 2007 FOBT Q 1 YEAR AGE 50-75 2007 ZOSTER VACCINE AGE 60> 2017 PAP AKA CERVICAL CYTOLOGY 2017 Influenza Age 5 to Adult 2018 Allergies as of 2018  Review Complete On: 2018 By: Maximino Grubbs Severity Noted Reaction Type Reactions Codeine  10/18/2012    Swelling Percocet [Oxycodone-acetaminophen]  10/18/2012    Swelling Sulfa (Sulfonamide Antibiotics)    Nausea Only Ultram [Tramadol]  10/18/2012    Nausea and Vomiting Vicodin [Hydrocodone-acetaminophen]  10/18/2012    Swelling Current Immunizations  Never Reviewed No immunizations on file. Not reviewed this visit You Were Diagnosed With   
  
 Codes Comments Arthritis of left foot    -  Primary ICD-10-CM: J73.007 ICD-9-CM: 716.97 S/P tendon repair     ICD-10-CM: X25.198 ICD-9-CM: V45.89 Vitals BP Pulse Temp Resp Height(growth percentile) Weight(growth percentile) 128/70 85 96.8 °F (36 °C) (Oral) 16 5' 4\" (1.626 m) 133 lb (60.3 kg) LMP SpO2 BMI OB Status Smoking Status 2002 93% 22.83 kg/m2 Postmenopausal Never Smoker BMI and BSA Data Body Mass Index Body Surface Area  
 22.83 kg/m 2 1.65 m 2 Preferred Pharmacy Pharmacy Name Phone [facility] - [place], [address] [phone] Your Updated Medication List  
  
   
 This list is accurate as of 6/27/18  6:06 PM.  Always use your most recent med list.  
  
  
  
  
 amitriptyline 25 mg tablet Commonly known as:  ELAVIL Take 1 Tab by mouth daily as needed. budesonide 3 mg capsule Commonly known as:  ENTOCORT EC  
  
 diclofenac 1 % Gel Commonly known as:  VOLTAREN Apply 4 g to affected area four (4) times daily. dicyclomine 10 mg capsule Commonly known as:  BENTYL  
  
 dronabinol 5 mg capsule Commonly known as:  Ivett Sic Take 5 mg by mouth two (2) times a day. EVISTA 60 mg tablet Generic drug:  raloxifene Take 60 mg by mouth daily. Indications: POST-MENOPAUSAL OSTEOPOROSIS PREVENTION  
  
 IMITREX 50 mg tablet Generic drug:  SUMAtriptan Take 50 mg by mouth once as needed. Indications: MIGRAINE  
  
 LEXAPRO 10 mg tablet Generic drug:  escitalopram oxalate Take 10 mg by mouth daily. LYSINE PO Take  by mouth.  
  
 melatonin 3 mg tablet Take 3 mg by mouth nightly. multivitamin tablet Commonly known as:  ONE A DAY Take 1 Tab by mouth daily. nitrofurantoin (macrocrystal-monohydrate) 100 mg capsule Commonly known as:  MACROBID  
  
 PROBIOTIC 4X 10-15 mg Tbec Generic drug:  B.infantis-B.ani-B.long-B.bifi Take  by mouth. tobramycin-dexamethasone ophthalmic suspension Commonly known as:  Chyrel Kitchen VITAMIN B-12 1,000 mcg sublingual tablet Generic drug:  cyanocobalamin  
3,000 mcg by SubLINGual route. VITAMIN D3 1,000 unit Cap Generic drug:  cholecalciferol Take 1,000 Units by mouth daily. We Performed the Following AMB POC XRAY, FOOT; COMPLETE, 3+ VIEW [10807 CPT(R)] AMB SUPPLY ORDER [[id number] Custom] Comments:  
 Custom Trilaminar orthotics inserts with medial posting To-Do List   
 08/29/2018 10:00 AM  
  Appointment with Aleida Kwon RD at [address] [place] [address] Patient Instructions Please follow up in 1 year. You are advised to contact us if your condition worsens. Posterior Tibial Tendinitis: Exercises Your Care Instructions Here are some examples of typical rehabilitation exercises for your condition. Start each exercise slowly. Ease off the exercise if you start to have pain. Your doctor or physical therapist will tell you when you can start these exercises and which ones will work best for you. How to do the exercises Calf wall stretch (back knee straight) 1. Stand facing a wall with your hands on the wall at about eye level. Put your affected leg about a step behind your other leg. 2. Keeping your back leg straight and your back heel on the floor, bend your front knee and gently bring your hip and chest toward the wall until you feel a stretch in the calf of your back leg. 3. Hold the stretch for at least 15 to 30 seconds. 4. Repeat 2 to 4 times. Calf wall stretch (knees bent) 1. Stand facing a wall with your hands on the wall at about eye level. Put your affected leg about a step behind your other leg. 2. Keeping both heels on the floor, bend both knees. Then gently bring your hip and chest toward the wall until you feel a stretch in the calf of your back leg. 3. Hold the stretch for at least 15 to 30 seconds. 4. Repeat 2 to 4 times. Hamstring wall stretch 1. Lie on your back in a doorway, with your good leg through the open door. 2. Slide your affected leg up the wall to straighten your knee. You should feel a gentle stretch down the back of your leg. 1. Do not arch your back. 2. Do not bend either knee. 3. Keep one heel touching the floor and the other heel touching the wall. Do not point your toes. 3. Hold the stretch for at least 1 minute to begin. Then over time, try to lengthen the time you hold the stretch to as long as 6 minutes. 4. Repeat 2 to 4 times.  
5. If you do not have a place to do this exercise in a doorway, there is another way to do it: 6. Lie on your back, and bend the knee of your affected leg. 7. Loop a towel under the ball and toes of that foot, and hold the ends of the towel in your hands. 8. Straighten your knee, and slowly pull back on the towel. You should feel a gentle stretch down the back of your leg. 9. Hold the stretch for 15 to 30 seconds. Or even better, hold the stretch for 1 minute if you can. 10. Repeat 2 to 4 times. Shin muscle stretch 1. Sit in a chair, with both feet flat on the floor. 2. Bend your affected leg behind you so that the top of your foot near your toes is flat on the floor and your toes are pointed away from your body. If you need to, you can hold on to the sides of the chair for support. 3. Hold the stretch for at least 15 to 30 seconds. You should feel a stretch in the front (shin) of your lower leg. 4. Repeat 2 to 4 times. Follow-up care is a key part of your treatment and safety. Be sure to make and go to all appointments, and call your doctor if you are having problems. It's also a good idea to know your test results and keep a list of the medicines you take. Where can you learn more? Go to http://pola-josiane.info/. Enter I459 in the search box to learn more about \"Posterior Tibial Tendinitis: Exercises. \" Current as of: March 21, 2017 Content Version: 11.4 © 0215-8949 Healthwise, Incorporated. Care instructions adapted under license by [employer] (which disclaims liability or warranty for this information). If you have questions about a medical condition or this instruction, always ask your healthcare professional. [place] [address] any warranty or liability for your use of this information. Introducing [facility]! [place] [employer] introduces [employer] [place] patient portal. Now you can access parts of your medical record, email your doctor's office, and request medication refills online. 1. In your internet browser, go to [email or web address] 2. Click on the First Time User? Click Here link in the Sign In box. You will see the New Member Sign Up page. 3. Enter your Giftxoxo Access Code exactly as it appears below. You will not need to use this code after youve completed the sign-up process. If you do not sign up before the expiration date, you must request a new code. · Giftxoxo Access Code: [email or web address] Expires: 9/24/2018  9:21 AM 
 
4. Enter the last four digits of your Social Security Number (xxxx) and Date of Birth (mm/dd/yyyy) as indicated and click Submit. You will be taken to the next sign-up page. 5. Create a I2 TELE[employer] INTERNATIONAt ID. This will be your Giftxoxo login ID and cannot be changed, so think of one that is secure and easy to remember. 6. Create a Giftxoxo password. You can change your password at any time. 7. Enter your Password Reset Question and Answer. This can be used at a later time if you forget your password. 8. Enter your e-mail address. You will receive e-mail notification when new information is available in [address] 9. Click Sign Up. You can now view and download portions of your medical record. 10. Click the Download Summary menu link to download a portable copy of your medical information. If you have questions, please visit the Frequently Asked Questions section of the Giftxoxo website. Remember, Giftxoxo is NOT to be used for urgent needs. For medical emergencies, dial 911. Now available from your iPhone and Android! Please provide this summary of care documentation to your next provider. Your primary care clinician is listed as Caroline Kunz. If you have any questions after today's visit, please call [phone].

## 2018-06-27 NOTE — PATIENT INSTRUCTIONS
Please follow up in 1 year. You are advised to contact us if your condition worsens. Posterior Tibial Tendinitis: Exercises  Your Care Instructions  Here are some examples of typical rehabilitation exercises for your condition. Start each exercise slowly. Ease off the exercise if you start to have pain. Your doctor or physical therapist will tell you when you can start these exercises and which ones will work best for you. How to do the exercises  Calf wall stretch (back knee straight)    1. Stand facing a wall with your hands on the wall at about eye level. Put your affected leg about a step behind your other leg. 2. Keeping your back leg straight and your back heel on the floor, bend your front knee and gently bring your hip and chest toward the wall until you feel a stretch in the calf of your back leg. 3. Hold the stretch for at least 15 to 30 seconds. 4. Repeat 2 to 4 times. Calf wall stretch (knees bent)    1. Stand facing a wall with your hands on the wall at about eye level. Put your affected leg about a step behind your other leg. 2. Keeping both heels on the floor, bend both knees. Then gently bring your hip and chest toward the wall until you feel a stretch in the calf of your back leg. 3. Hold the stretch for at least 15 to 30 seconds. 4. Repeat 2 to 4 times. Hamstring wall stretch    1. Lie on your back in a doorway, with your good leg through the open door. 2. Slide your affected leg up the wall to straighten your knee. You should feel a gentle stretch down the back of your leg. 1. Do not arch your back. 2. Do not bend either knee. 3. Keep one heel touching the floor and the other heel touching the wall. Do not point your toes. 3. Hold the stretch for at least 1 minute to begin. Then over time, try to lengthen the time you hold the stretch to as long as 6 minutes. 4. Repeat 2 to 4 times.   5. If you do not have a place to do this exercise in a doorway, there is another way to do it:  6. Lie on your back, and bend the knee of your affected leg. 7. Loop a towel under the ball and toes of that foot, and hold the ends of the towel in your hands. 8. Straighten your knee, and slowly pull back on the towel. You should feel a gentle stretch down the back of your leg. 9. Hold the stretch for 15 to 30 seconds. Or even better, hold the stretch for 1 minute if you can. 10. Repeat 2 to 4 times. Shin muscle stretch    1. Sit in a chair, with both feet flat on the floor. 2. Bend your affected leg behind you so that the top of your foot near your toes is flat on the floor and your toes are pointed away from your body. If you need to, you can hold on to the sides of the chair for support. 3. Hold the stretch for at least 15 to 30 seconds. You should feel a stretch in the front (shin) of your lower leg. 4. Repeat 2 to 4 times. Follow-up care is a key part of your treatment and safety. Be sure to make and go to all appointments, and call your doctor if you are having problems. It's also a good idea to know your test results and keep a list of the medicines you take. Where can you learn more? Go to http://pola-josiane.info/. Enter X454 in the search box to learn more about \"Posterior Tibial Tendinitis: Exercises. \"  Current as of: March 21, 2017  Content Version: 11.4  © 1197-1798 [employer]. Care instructions adapted under license by [employer]books (which disclaims liability or warranty for this information). If you have questions about a medical condition or this instruction, always ask your healthcare professional. [place] [address] any warranty or liability for your use of this information.

## 2018-06-29 ENCOUNTER — TELEPHONE (OUTPATIENT)
Dept: ORTHOPEDIC SURGERY | Age: 63
End: 2018-06-29

## 2018-06-29 NOTE — TELEPHONE ENCOUNTER
Pt called in stating the order for her orthotics needs to be changed to say that this is a worker's comp claim. The order she received on the 26th states that it is not worker's comp and that [employer] [facility] would not accept it. Please advise pt at [phone] when order has been updated.

## 2018-07-06 NOTE — TELEPHONE ENCOUNTER
Patient called in requesting to see if order has been corrected yet. She states as of 07/04/18 when she spoke with stanley it was not ready. Please advise patient at [phone].

## 2018-07-17 ENCOUNTER — TELEPHONE (OUTPATIENT)
Dept: ORTHOPEDIC SURGERY | Age: 63
End: 2018-07-17

## 2018-07-17 NOTE — TELEPHONE ENCOUNTER
[facility] [employer] called in states the order from 07/02/18 for Custom Trilaminar orthotics inserts with medial posting is a workers comp claim and that Dr. Gilford Jesus has to be the one to sign it not Lilyan Severs. Please Fax to [phone].

## 2018-07-26 NOTE — PROCEDURES
FOOT X RAYS 3 VIEWS Left   7/26/2018    WEIGHT BEARING    X RAYS AT [facility] [address] [place] [address]  7/26/2018      Bones: No fractures or dislocations. No focal osteolytic or osteoblastic process     Bone Spurs: No significant bone spurs  Foot Alignment: Mild Pes Planus  Joint Condition:   MILD OA 1ST CC REGION// PRIOR CALCANEAL OSTEOTOMY OF CALCANEUS  Soft Tissues: Normal, No radiopaque foreign body and No abnormal calcific densities to soft tissues   No ankle joint effusion in lateral projection. Mineralization: Suggests  no Osteopenia    I have personally reviewed the results of the above study.  The interpretation of this study is my professional opinion

## 2018-08-29 ENCOUNTER — HOSPITAL ENCOUNTER (OUTPATIENT)
Dept: NUTRITION | Age: 63
Discharge: HOME | End: 2018-08-29
Payer: COMMERCIAL

## 2018-08-29 PROCEDURE — 97803 MED NUTRITION INDIV SUBSEQ: CPT

## 2018-08-29 NOTE — PROGRESS NOTES
NUTRITION - FOLLOW-UP TREATMENT NOTE  Patient Name: Erich Muller         Date: 2018  : 1955    YES Patient  Verified  Diagnosis: Sucrose Malabsorption   In time:     1000          Out time:   1030   Total Treatment Time (min):   30     SUBJECTIVE/ASSESSMENT    Changes in medication or medical history? Any new allergies, surgeries or procedures? NO    If yes, update Summary List   Pt was doing well up until 3 weeks ago when mother had health issues. She has had increased stress which has negatively impacted her GI stability leaving her with multiple loose stools daily. She decided on Monday of this week (3 day ago) to increase her Budesonide to 2 doses per day and that has resolved her GI issues. She was also eating oranges and feels the acidity was not helping her condition. She has an appt with GI doctor in a week. She has been tracking food intake consistently and notes when she exercises she comes in below daily calorie goals. She has maintained her weight though, which is one of her goals. We discussed increasing protein intake and things to try, foods, recipes, products, etc. Pt is otherwise stable and her mother is back to good health, which reduces Patients stress. Pt is also going out of the country starting Sept 15 and would like to insure she remains stable for the cruise. Will continue to follow and assist pt in nutritional needs, she remains very compliant and motivated.     Current Wt: 130.4 Previous Wt: 130.2 Wt Change: 0     Achievement of Goals: - Get back to maintaining a food journal to guardado sure you are eating enough= continue  - have no less than 1200 philip/day; keep sugar limits under control (4-6gm) don't push it= continue  - Get enough protein prevents atrophy and look at increasing healthy fats for high calorie without overwhelming portions= continue  - get back to having shakes (protein) = Not met, revised below         Patient Education:  [x]  Review current plan with patient   []  Other:    Handouts/  Information Provided: []  Carbohydrates  []  Protein  []  Fiber  []  Serving Sizes  []  Fluids  []  General guidelines []  Diabetes  []  Cholesterol  []  Sodium  []  SBGM  []  Food Journals  [x]  Others: Vegetable list     New Patient Goals: - When having increased stool output avoid highly acidic foods  - Blend ground poultry and lean ground beef or use lean cuts of beef to increase protein as well as provide more variety with meat options  - try protein powder in ripple milk, Quest bar - higher calorie, high protein, low sugar  - increase vegetable intake  - focus on getting to calorie limit, keep tracking and eating enough     PLAN    [x]  Continue on current plan []  Follow-up PRN   []  Discharge due to :    [x]  Next appt: 13 Nov @ 1 [address]rea     Dietitian: Medhat Miller MA, RD    Date: 8/29/2018 Time: 10:06 AM

## 2018-11-13 ENCOUNTER — HOSPITAL ENCOUNTER (OUTPATIENT)
Dept: NUTRITION | Age: 63
Discharge: HOME | End: 2018-11-13
Payer: COMMERCIAL

## 2018-11-13 PROCEDURE — 97803 MED NUTRITION INDIV SUBSEQ: CPT

## 2018-11-13 NOTE — PROGRESS NOTES
NUTRITION - FOLLOW-UP TREATMENT NOTE  Patient Name: Oswald Longest         Date: 2018  : 1955    YES Patient  Verified  Diagnosis: Sucrose Malabsorption   In time:   1130            Out time:   1200   Total Treatment Time (min):   30     SUBJECTIVE/ASSESSMENT    Changes in medication or medical history? Any new allergies, surgeries or procedures? NO    If yes, update Summary List   Pt has been doing okay since last visit. Shewent on a cruise and did not follow her diet, but she did do very well and had no issues except constipation. She has gone back and forth with constipation and diarrhea since getting home. She admittedly has been lazy with her diet and inconsistent and knows/understands the importance of staying on a routine and being consistent. Also, discussed fluid requirements as well as eating well balanced meals that contain the appropriate amount of calories. She has been leaning on protein bars and other convenience foods to be a meal replacement. Discussed not doing this and getting back on track. Pt is agreeable and will be compliant. WIll follow-up in Feb at new location at [facility].    Current Wt: 135.8 Previous Wt: 130.2 Wt Change: +5     Achievement of Goals: -- When having increased stool output avoid highly acidic foods= Met, continue  - Blend ground poultry and lean ground beef or use lean cuts of beef to increase protein as well as provide more variety with meat options= Met, continue  - try protein powder in ripple milk, Quest bar - higher calorie, high protein, low sugar= Met, continue  - increase vegetable intake= Not met, revised below  - focus on getting to calorie limit, keep tracking and eating enough = Not met, revised below         Patient Education:  [x]  Review current plan with patient   []  Other:    Handouts/  Information Provided: []  Carbohydrates  []  Protein  []  Fiber  []  Serving Sizes  []  Fluids  []  General guidelines []  Diabetes  [] Cholesterol  []  Sodium  []  SBGM  []  Food Journals  []  Others:      New Patient Goals: - Get back to a consistent intake of foods you know you should be eating and avoiding those that you do not tolerate  -increase water intake to at least 64 oz per day (coffee does not count) space it out  - Eat more during the day with well balanced meals, get into a routine and structure.  Protein bars are not meals     PLAN    [x]  Continue on current plan []  Follow-up PRN   []  Discharge due to :    [x]  Next appt: 6 Feb @1000 @ [address] [facility]     Dietitian: Sravanthi Estes MA, RD    Date: 11/13/2018 Time: 11:22 AM

## 2019-02-06 ENCOUNTER — HOSPITAL ENCOUNTER (OUTPATIENT)
Dept: NUTRITION | Age: 64
Discharge: HOME | End: 2019-02-06
Payer: COMMERCIAL

## 2019-02-06 PROCEDURE — 97803 MED NUTRITION INDIV SUBSEQ: CPT

## 2019-02-06 NOTE — PROGRESS NOTES
NUTRITION - FOLLOW-UP TREATMENT NOTE  Patient Name: Li Antunez         Date: 2019  : 1955    YES Patient  Verified  Diagnosis: sucrose malabsorption   In time:   1000             Out time:   1030   Total Treatment Time (min):   30     SUBJECTIVE/ASSESSMENT    Changes in medication or medical history? Any new allergies, surgeries or procedures? NO    If yes, update Summary List   No changes at this time         Current Wt: 133 Previous Wt: 135.8 Wt Change: -2.8     Achievement of Goals: Pt is doing great. She is working on being more consistent with her eating. She admits she does better when she is with someone. She is learning that she needs to have her bigger meal earlier in the day to make sure she eats it. She knows to not use the protein bar as a meal and that it can be a snack to bridge the gap between meals. She is working on water intake getting close to 64 oz per day. She continues to do stas 2 times per week, and would like to look into doing it more often. Otherwise she has learned what food she needs to avoids and how to eat to best suit her lifestyle. Will continue to follow periodically to keep her accountable. Patient Education:  [x]  Review current plan with patient   []  Other:    Handouts/  Information Provided: []  Carbohydrates  []  Protein  []  Fiber  []  Serving Sizes  []  Fluids  []  General guidelines []  Diabetes  []  Cholesterol  []  Sodium  []  SBGM  []  Food Journals  []  Others:      New Patient Goals: - get larger meal consumed earlier in the day, remain consistent with eating habits, especially if you are by yourself.   - Look into other Stas classes offered other days of the week  - no coffee until after you have eaten (this is an appetite suppressant)      PLAN    [x]  Continue on current plan []  Follow-up PRN   []  Discharge due to :    [x]  Next appt:  @ [id number] [address]     Dietitian: Kaedem Mohan MA, RD    Date: 2019 Time: 9:56 AM

## 2019-06-18 ENCOUNTER — HOSPITAL ENCOUNTER (OUTPATIENT)
Dept: NUTRITION | Age: 64
Discharge: HOME | End: 2019-06-18
Payer: COMMERCIAL

## 2019-06-18 PROCEDURE — 97803 MED NUTRITION INDIV SUBSEQ: CPT

## 2019-06-19 NOTE — PROGRESS NOTES
NUTRITION - FOLLOW-UP TREATMENT NOTE  Patient Name: Dante Saldaña         Date: 2019  : 1955    YES Patient  Verified  Diagnosis: Sucrose malabsorption   In time:   1100             Out time:   1130   Total Treatment Time (min):   30     SUBJECTIVE/ASSESSMENT    Changes in medication or medical history? Any new allergies, surgeries or procedures? YES      If yes, update Summary List   Dose adjustments with meds during flare-up, but is now off lynzes and budesonide       Current Wt: 141.6 Previous Wt: 133 Wt Change: +8.6     Achievement of Goals: Pt is doing well, but has had some GI issues since last visit. She fully admits she has been allowing more sugars into her diet as well as she has been having more coffee than water. She was able to get her medication situated and has been able to remain off and stable for some time. She is on the upswing currently. She is upset that she has gained weight and she does not know how to get it off. Discussed how little she eats and that she needs to make her small meals more substantial and focus on getting more protein in. Provided examples and ideas. Pt will continue to come on current schedule of every 4 months. Compliance is expected.           Patient Education:  [x]  Review current plan with patient   []  Other:    Handouts/  Information Provided: []  Carbohydrates  []  Protein  []  Fiber  []  Serving Sizes  []  Fluids  []  General guidelines []  Diabetes  []  Cholesterol  []  Sodium  []  SBGM  []  Food Journals  []  Others:      New Patient Goals: - Get at least 64 oz of water daily; less coffee  - Make breakfast and dinner more substantial; shredded chicken and pork tenderloin in crock pot  - Back off sugar     PLAN    []  Continue on current plan []  Follow-up PRN   []  Discharge due to :    [x]  Next appt: 15 Oct @ [address] [address]     Dietitian: Cassidy Barrera MA, RD    Date: 2019 Time: 8:36 AM

## 2019-10-08 ENCOUNTER — HOSPITAL ENCOUNTER (OUTPATIENT)
Dept: NUTRITION | Age: 64
Discharge: HOME | End: 2019-10-08
Payer: COMMERCIAL

## 2019-10-08 PROCEDURE — 97803 MED NUTRITION INDIV SUBSEQ: CPT

## 2019-10-08 NOTE — PROGRESS NOTES
NUTRITION - FOLLOW-UP TREATMENT NOTE  Patient Name: Puja Nielson         Date: 10/8/2019  : 1955    YES Patient  Verified  Diagnosis: Sucrose malabsorption   In time:   1100             Out time:   1130   Total Treatment Time (min):   30     SUBJECTIVE/ASSESSMENT    Changes in medication or medical history? Any new allergies, surgeries or procedures? NO    If yes, update Summary List   No change         Current Wt: 137.8 Previous Wt: 141. 6 Wt Change: -3.8     Achievement of Goals: Pt has been feeling very well; outside of Bacterial infection with antibiotic use in September. She has had no dietary concerns or flare-ups. She is feeling well, losing some weight and generally feels like things have regulated. She has not been traveling as much and her routine has been consistent. She is going on a cruise in November and discussed this being a stressor. ALso discussed fiber sources, insuring she remain balanced with her diet and well nourished. Reviewed looking for sucrose in items and keeping her exercise consistent. will see patient again in February. She is doing [employer]          Patient Education:  [x]  Review current plan with patient   []  Other:    Handouts/  Information Provided: []  Carbohydrates  []  Protein  []  Fiber  []  Serving Sizes  []  Fluids  []  General guidelines []  Diabetes  []  Cholesterol  []  Sodium  []  SBGM  []  Food Journals  []  Others:      New Patient Goals: - Continue on track with diet and medication; keep sucrose to 5-6 gm per day, eat well balanced  - Be consistent with exercise, stas, walking, pinterest exercises at home  - Watch for sucrose in the ingredients list if sugar is listed on the label  - keep fiber between 25-35 gm/day (see handout on soluble vs insoluble)     PLAN    []  Continue on current plan []  Follow-up PRN   []  Discharge due to :    []  Next appt:  @ [id number]     Dietitian: Jens Moritz, MA, RD    Date: 10/8/2019 Time: 12:14 PM

## 2019-10-15 ENCOUNTER — APPOINTMENT (OUTPATIENT)
Dept: NUTRITION | Age: 64
End: 2019-10-15
Payer: COMMERCIAL

## 2020-02-25 ENCOUNTER — HOSPITAL ENCOUNTER (OUTPATIENT)
Dept: NUTRITION | Age: 65
Discharge: HOME | End: 2020-02-25
Payer: COMMERCIAL

## 2020-02-25 PROCEDURE — 97803 MED NUTRITION INDIV SUBSEQ: CPT

## 2020-02-25 NOTE — PROGRESS NOTES
NUTRITION - FOLLOW-UP TREATMENT NOTE  Patient Name: Arvin Berumen         Date: 2020  : 1955    YES Patient  Verified  Diagnosis: Sucrose malabsorption   In time:   1100             Out time:   1130   Total Treatment Time (min):   30     SUBJECTIVE/ASSESSMENT    Changes in medication or medical history? Any new allergies, surgeries or procedures? NO    If yes, update Summary List   No change         Current Wt: 137.8 Previous Wt: 141. 6 Wt Change: -3.8     Achievement of Goals: Pt has not been adhering to her diet as strictly as she usually does. Started with getting sick after last appointment, to not going on vacation, to the holidays, and now just needing help to get back on track. Discussed her need to cut back on sugar, and sugar alcohols/substitutes as well as soda. Also suggested she follow the FODMAP diet for 2 weeks to \"reset\" and get herself regular again with out having a massive evacuation for multiple days (pt has been constipated). She does better when she tracks what she eats as well, she has been consistent with going to gar[employer] 2 days per week and finding ways to increase her activity through day-to-day errands, appointments, projects, etc. She admits her water intake has not been very high and she is definitely leaning too heavily on protein bars and pre-made snacks and foods as she does not plan food ahead of time. Pt agreed to all and knows she needs to get back on track. See below for new goals.           Patient Education:  [x]  Review current plan with patient   []  Other:    Handouts/  Information Provided: []  Carbohydrates  []  Protein  []  Fiber  []  Serving Sizes  []  Fluids  []  General guidelines []  Diabetes  []  Cholesterol  []  Sodium  []  SBGM  []  Food Journals  []  Others:      New Patient Goals: -Increase water intake to 64-96 oz of water/fluids; especially on non-exercise days  - Cut sugar back out of diet; stop drinking soda and try naturally flavored sparkling water  -Make sure you are eating @ consistent times; prepare food ahead of time; whether it is a snack or a meal so you are less dependent on packaged foods  - Do a FODMAP reset for at least 2 weeks and combine with knowledge of foods you know you can tolerate. PLAN    []  Continue on current plan []  Follow-up PRN   []  Discharge due to :    []  Next appt:  May 7 @ [id number]     Dietitian: Angel Smith MA, RD    Date: 2/25/2020 Time: 12:14 PM

## 2020-05-07 ENCOUNTER — APPOINTMENT (OUTPATIENT)
Dept: NUTRITION | Age: 65
End: 2020-05-07

## 2020-07-28 ENCOUNTER — HOSPITAL ENCOUNTER (OUTPATIENT)
Dept: NUTRITION | Age: 65
Discharge: HOME | End: 2020-07-28
Payer: COMMERCIAL

## 2020-07-28 PROCEDURE — 97803 MED NUTRITION INDIV SUBSEQ: CPT

## 2020-07-28 NOTE — PROGRESS NOTES
NUTRITION  FOLLOW-UP TREATMENT NOTE  Patient Name: Sandrita Vann         Date: 2020  : 1955    YES Patient  Verified  Diagnosis: Sucrose malabsorption   In time:  0900            Out time:   0930   Total Treatment Time (min):   30     SUBJECTIVE/ASSESSMENT    Changes in medication or medical history? Any new allergies, surgeries or procedures? NO    If yes, update Summary List   No change         Current Wt: 147.2 Previous Wt: 137.8 Wt Change: +10     Achievement of Goals: Met with patient to day for follow-up visit. Pt admits she has gained some weight as she has not been able to exercise since her [employer] class has been postponed. She has been having GI inconsistencies (more constipation than anything). She admits it may be related to her diet and other habits. This has resolved recently and she has been consistent with her BMs. She Is due for a colonoscopy, discussed natural laxative foods she can try as well as answered all other related questions. Pt is concerned about weight gain, discussed things she can do at home as she is concerned she is at higher risk as well as her mother is high risk. She knows what she needs to do, but she needs to do it. Discussed backing off synthetic/procvessed foods and choosing more natural options for nutrients.           Patient Education:  [x]  Review current plan with patient   []  Other:    Handouts/  Information Provided: []  Carbohydrates  []  Protein  []  Fiber  []  Serving Sizes  []  Fluids  []  General guidelines []  Diabetes  []  Cholesterol  []  Sodium  []  SBGM  []  Food Journals  []  Others:      New Patient Goals: -Increase water intake to 64-96 oz of water/fluids; especially on non-exercise days  - Try pureed fruits (remember the Ps) to help with constipation and staying regular  - Use the handout on food high in Zinc as a reference to add more to your diet  - Youtube free wesley classes as well as check out [place], netflix and [employer] for other free workouts.        PLAN    [x]  Continue on current plan []  Follow-up PRN   []  Discharge due to :    [x]  Next appt: Call in one month to get on the schedule with new RD     Dietitian: Jami Finley MA, RD    Date: 7/28/2020 Time: 12:14 PM

## 2020-10-30 ENCOUNTER — TRANSCRIBE ORDER (OUTPATIENT)
Dept: SCHEDULING | Age: 65
End: 2020-10-30

## 2020-10-30 DIAGNOSIS — M85.9 DISORDER OF BONE DENSITY AND STRUCTURE, UNSPECIFIED: ICD-10-CM

## 2020-10-30 DIAGNOSIS — Z91.89 AT RISK FOR LOSS OF BONE DENSITY: Primary | ICD-10-CM

## 2021-09-28 NOTE — PROGRESS NOTES
[facility] AMBULATORY PROGRESS NOTE      Patient: Lino Delaney             MRN: [id number]     SSN: [id number] Body mass index is 24.89 kg/m². YOB: 1955     AGE: 61 y.o. EX: female    PCP: Tito Albarran MD       IMPRESSION //  DIAGNOSIS AND TREATMENT PLAN        Lino Delaney has a diagnosis of:      *Overall discomfort, has improved over the last 2 days. She has some mild tenderness to FHL plantar portion of her of her foot and a slight fullness over this FHL, possibly indicating a nodularity to the FHL tendon. Watch her closely for this I will give her a custom orthotic additional plans listed as below    DIAGNOSES    1. Posterior tibialis tendinitis of both lower extremities    2. Pes planus of both feet    3. Left foot pain        Orders Placed This Encounter    Generic Supply Order     Bilateral Custom trilaminar orthotic medially posted w/ met pad    [73591] Foot Min 3V     Order Specific Question:   Weight bearing? Answer:   No        PLAN:    1. Obtain 3-View XR left foot pain  2. DME: Custom trilaminar orthotic medially posted w/ met pad        RTO-  1 month    Lino Delaney  expresses understanding of the diagnosis, treatment plan, and all of their proposed questions were answered to their satisfaction. Patient education has been provided re the diagnoses. HPI //  [address] IS A 61 y.o. female who is a/an new patient, presenting to my outpatient office for evaluation of  the following chief complaint(s):     Chief Complaint   Patient presents with    Foot Pain     left foot     Patient presents today w/ left foot pain onset 9/11/2021. She states the foot hurts so bad that se could barely walk. She mentions  she  Initially had pain while wearing orthotics and then it progressed to her having pain while bare foot, but the pain has subsided.  She notes she rubbed a cream into her left arch, which has alleviated her left foot pain.    Visit Vitals  Pulse 89   Temp 96.9 °F (36.1 °C) (Temporal)   Resp 15   Ht 5' 4\" (1.626 m)   Wt 145 lb (65.8 kg)   LMP 04/18/2002   SpO2 94%   BMI 24.89 kg/m²       Appearance: Alert, well appearing and pleasant patient who is in no distress, oriented to person, place/time, and who follows commands. This patient is accompanied in the examination room by her  self. There is signs of: no dementia  Psychiatric: Affect/mood are appropriate. Speech normal in context and clarity, memory intact grossly, no involuntary movements - tremors. Patient arrives to office via: without assistive device:   H EENT (2): Head normocephalic & atraumatic. Eye: pupils are round// EOM are intact // Neck: ROM WNL  // Hearings Intact   Respiratory: Breathing non labored     ANKLE/FOOT left    Gait: normal  Tenderness: mild over  ATFL/FHL///mild tenderness of plantar portion of the FHL  Cutaneous:   WNL. Joint Motion:   WNL  Joint / Tendon Stability: No Ankle or Subtalar instability or joint laxity. No peroneal sublux ability or dislocation  Alignment: neutral Hindfoot,    Neuro Motor/Sensory: NL/NL  Vascular: NL foot/ankle pulses,   Lymphatics: No extremity lymphedema, No calf swelling, no tenderness to calf muscles. CHART REVIEW     Arline Jain has been experiencing pain and discomfort confirmed as outlined in the pain assessment outlined below.  was reviewed by Tyler Mendenhall MD on 10/4/2021.      Pain Assessment  10/4/2021   Location of Pain Foot   Location Modifiers Left   Severity of Pain 0   Quality of Pain Aching;Dull   Duration of Pain -   Frequency of Pain Intermittent   Date Pain First Started (No Data)   Date Pain First Started Comment 2 weeks ago   Aggravating Factors Bending;Stretching;Straightening;Walking   Limiting Behavior Some   Relieving Factors Exercises   Result of Injury -   Work-Related Injury -   Type of Injury -        Arline Jain  has a past medical history of Appetite loss, Cancer ([facility] [address] (2001), Chronic pain, Insomnia, Left foot pain (Oct 2013), Migraines, Nausea, Osteopenia, Other ill-defined conditions(799.89), and Posterior tibial tendonitis (2007). Patients is employed at:         Past Medical History:   Diagnosis Date    Appetite loss     Cancer [facility]) 2001    Hx of Right breast cancer    Chronic pain     Timo legs    Insomnia     Left foot pain Oct 2013    left flexor hallucis longus tendonitits    Migraines     Nausea     Osteopenia     Other ill-defined conditions(799.89)     very anxious    Posterior tibial tendonitis 2007    left, with interstitial tear; s/p repair     Past Surgical History:   Procedure Laterality Date    HX CHOLECYSTECTOMY  6/2012    HX COLONOSCOPY      multiple    HX OOPHORECTOMY      HX ORTHOPAEDIC Left Aug 2007    posterior tibial tendon debridement and repair, calcaneal osteotomy    HX ORTHOPAEDIC  June 2008    removal of hardware, left heel    PA BREAST SURGERY PROCEDURE UNLISTED      Right partial mastectomy     Current Outpatient Medications   Medication Sig    LYSINE PO Take  by mouth.  diclofenac (VOLTAREN) 1 % gel Apply  to affected area two (2) times a day. Apply to affected area as directed.  dicyclomine (BENTYL) 10 mg capsule     nitrofurantoin, macrocrystal-monohydrate, (MACROBID) 100 mg capsule     escitalopram oxalate (LEXAPRO) 10 mg tablet Take 10 mg by mouth daily.  dronabinol (MARINOL) 5 mg capsule Take 5 mg by mouth two (2) times a day.  amitriptyline (ELAVIL) 25 mg tablet Take 1 Tab by mouth daily as needed.  diclofenac (VOLTAREN) 1 % topical gel Apply 4 g to affected area four (4) times daily.  SUMAtriptan (IMITREX) 50 mg tablet Take 50 mg by mouth once as needed. Indications: MIGRAINE    multivitamin (ONE A DAY) tablet Take 1 Tab by mouth daily.  B.infantis-B.ani-B.long-B.bifi (PROBIOTIC 4X) 10-15 mg Tab Take  by mouth.       cyanocobalamin (VITAMIN B-12) 1,000 mcg Subl 3,000 mcg by SubLINGual route.  Cholecalciferol, Vitamin D3, (VITAMIN D3) 1,000 unit cap Take 1,000 Units by mouth daily.  raloxifene (EVISTA) 60 mg tablet Take 60 mg by mouth daily. Indications: POST-MENOPAUSAL OSTEOPOROSIS PREVENTION    melatonin 3 mg tablet Take 3 mg by mouth nightly.  tobramycin-dexamethasone (TOBRADEX) ophthalmic suspension  (Patient not taking: Reported on 10/4/2021)    budesonide (ENTOCORT EC) 3 mg capsule  (Patient not taking: Reported on 10/4/2021)     No current facility-administered medications for this visit. Allergies   Allergen Reactions    Codeine Swelling    Percocet [Oxycodone-Acetaminophen] Swelling    Sulfa (Sulfonamide Antibiotics) Nausea Only    Ultram [Tramadol] Nausea and Vomiting    Vicodin [Hydrocodone-Acetaminophen] Swelling     Social History     Occupational History    Not on file   Tobacco Use    Smoking status: Never Smoker    Smokeless tobacco: Never Used   Substance and Sexual Activity    Alcohol use: No     Comment: rarely    Drug use: No    Sexual activity: Not on file     Family History   Problem Relation Age of Onset    Arthritis-osteo Mother     Hypertension Mother         DIAGNOSTIC LAB DATA      No results found for: HBA1C, TIE7ZFZB, FFV7TQMJ //   Lab Results   Component Value Date/Time    Glucose 87 10/22/2009 09:18 AM    Glucose, POC 83 10/19/2012 12:58 PM        No results found for: FEP3ANBJ, XUA9HGZU      Lab Results   Component Value Date/Time    Vitamin D 25-Hydroxy 41 10/22/2009 09:18 AM         REVIEW OF SYSTEMS : 10/4/2021  ALL BELOW ARE Negative except : SEE HPI     All other systems reviewed and are negative. 12 point review of systems otherwise negative unless noted in HPI. DIAGNOSTIC IMAGING /ORDERS       Orders Placed This Encounter    Generic Supply Order     Bilateral Custom trilaminar orthotic medially posted w/ met pad    [26243] Foot Min 3V     Order Specific Question:   Weight bearing? Answer:   No        FOOT X RAYS 3 VIEWS LEFT  10/4/2021    NON WEIGHT BEARING    X RAYS AT [facility] [address] [place] [address]  10/4/2021      Bones: No fractures or dislocations. No focal osteolytic or osteoblastic process     Bone Spurs: No significant bone spurs  Foot Alignment: WNL  Joint Condition: No Significant OA  Soft Tissues: Normal, No radiopaque foreign body and No abnormal calcific densities to soft tissues   No ankle joint effusion in lateral projection. Mineralization: Suggests  no Osteopenia    I have personally reviewed the results of the above study and the interpretation of this study is my professional opinion     I have reviewed the results of the above study. The interpretation of this study is my professional opinion. On this date 10/04/2021 I have spent 25 minutes reviewing previous notes, test results and face to face with the patient discussing the diagnosis and importance of compliance with the treatment plan as well as documenting on the day of the visit. An electronic signature was used to authenticate this note. Disclaimer: Sections of this note are dictated using utilizing voice recognition software, which may have resulted in some phonetic based errors in grammar and contents. Even though attempts were made to correct all the mistakes, some may have been missed, and remained in the body of the document. If questions arise, please contact our department. Arline Jain may have a reminder for a \"due or due soon\" health maintenance. I have asked that she contact her primary care provider for follow-up on this health maintenance.     Earnest Cronin, as dictated byTyler  10/4/2021  1:04 PM

## 2021-10-04 ENCOUNTER — OFFICE VISIT (OUTPATIENT)
Dept: ORTHOPEDIC SURGERY | Age: 66
End: 2021-10-04
Payer: OTHER GOVERNMENT

## 2021-10-04 VITALS
TEMPERATURE: 96.9 F | WEIGHT: 145 LBS | HEART RATE: 89 BPM | RESPIRATION RATE: 15 BRPM | BODY MASS INDEX: 24.75 KG/M2 | OXYGEN SATURATION: 94 % | HEIGHT: 64 IN

## 2021-10-04 DIAGNOSIS — M76.822 POSTERIOR TIBIALIS TENDINITIS OF BOTH LOWER EXTREMITIES: Primary | ICD-10-CM

## 2021-10-04 DIAGNOSIS — M21.41 PES PLANUS OF BOTH FEET: ICD-10-CM

## 2021-10-04 DIAGNOSIS — M76.821 POSTERIOR TIBIALIS TENDINITIS OF BOTH LOWER EXTREMITIES: Primary | ICD-10-CM

## 2021-10-04 DIAGNOSIS — M79.672 LEFT FOOT PAIN: ICD-10-CM

## 2021-10-04 DIAGNOSIS — M21.42 PES PLANUS OF BOTH FEET: ICD-10-CM

## 2021-10-04 PROCEDURE — 99213 OFFICE O/P EST LOW 20 MIN: CPT | Performed by: ORTHOPAEDIC SURGERY

## 2021-10-04 PROCEDURE — 73630 X-RAY EXAM OF FOOT: CPT | Performed by: ORTHOPAEDIC SURGERY

## 2021-10-25 NOTE — PROGRESS NOTES
AMBULATORY PROGRESS NOTE      Patient: Nii Espinoza             MRN: [id number]     SSN: [id number] Body mass index is 25.06 kg/m². YOB: 1955     AGE: 61 y.o. EX: female    PCP: Poncho Cole MD       IMPRESSION //  DIAGNOSIS AND TREATMENT PLAN        Nii Espinoza has a diagnosis of:       Left plantar fasciitis, continue home excise program    DIAGNOSES    1. Plantar fascia syndrome        No orders of the defined types were placed in this encounter. PLAN:    1. HEP  2. 2 WEEKS RTO    RTO      There are no Patient Instructions on file for this visit. Please follow up with your PCP for any health maintenance as recommended         Nii Espinoza  expresses understanding of the diagnosis, treatment plan, and all of their proposed questions were answered to their satisfaction. Patient education has been provided re the diagnoses. HPI //  [address] IS A 61 y.o. female who is a/an  established patient, presenting to my outpatient office for evaluation of  the following chief complaint(s):     Chief Complaint   Patient presents with    Foot Pain     left       At [address] [facility] presented w/ left foot pain. Obtain 3-View XR left foot pain. DME: Custom trilaminar orthotic medially posted w/ met pad. Since LOV Gisela Eubanks  Continues to endorse left foot pain. Quite well. 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 However on Sunday, 3 days ago, she woke up at 2 AM, as she stepped down from the bed she is going to the bathroom, she experienced severe pain to plantar portion of her left hindfoot on the plantar fascia. She did not feel a pop but did have severe pain.     Visit Vitals  Pulse (!) 105   Temp 97.1 °F (36.2 °C)   Resp 16   Ht 5' 4\" (1.626 m)   Wt 146 lb (66.2 kg)   LMP 04/18/2002   SpO2 100%   BMI 25.06 kg/m²       Appearance: Alert, well appearing and pleasant patient who is in no distress, oriented to person, place/time, and who follows commands. Normal dress/motor activity/thought processes/memory. This patient is accompanied in the examination room by her  self. There is signs of: no dementia  Patient arrives to office via: without assistive device:    Psychiatric:  Normal Affect/mood. Judgement, behavior, and conduct are appropriate. Speech normal in context and clarity, memory intact grossly, no involuntary movements - tremors. H EENT (2): Head normocephalic & atraumatic. Eye: pupils are round// EOM are intact // Neck: ROM WNL  // Hearings Intact   Respiratory: Breathing non labored     ANKLE/FOOT left    Gait: slow  Tenderness: mild moderate over  plantar fascia  Cutaneous:   WNL. Joint Motion:   WNL   Joint / Tendon Stability: No Ankle or Subtalar instability or joint laxity. No peroneal sublux ability or dislocation  Alignment: neutral Hindfoot,    Neuro Motor/Sensory: NL/NL  Vascular: NL foot/ankle pulses,   Lymphatics: No extremity lymphedema, No calf swelling, no tenderness to calf muscles. CHART REVIEW     Francisco Javier Lucero has been experiencing pain and discomfort confirmed as outlined in the pain assessment outlined below.  was reviewed by Stephen Zuñiga MD on 10/26/2021.      Pain Assessment  10/26/2021   Location of Pain Foot   Location Modifiers Left   Severity of Pain 7   Quality of Pain Aching   Duration of Pain Persistent   Frequency of Pain Constant   Date Pain First Started -   Date Pain First Started Comment -   Aggravating Factors Walking   Limiting Behavior Yes   Relieving Factors Rest   Result of Injury No   Work-Related Injury -   Type of Injury -        Francisco Javier Lucero  has a past medical history of Appetite loss, Cancer ([facility] [address] (2001), Chronic pain, Insomnia, Left foot pain (Oct 2013), Migraines, Nausea, Osteopenia, Other ill-defined conditions(799.89), and Posterior tibial tendonitis (2007). Patients is employed at:         Past Medical History:   Diagnosis Date    Appetite loss     Cancer [facility]) 2001    Hx of Right breast cancer    Chronic pain     Timo legs    Insomnia     Left foot pain Oct 2013    left flexor hallucis longus tendonitits    Migraines     Nausea     Osteopenia     Other ill-defined conditions(799.89)     very anxious    Posterior tibial tendonitis 2007    left, with interstitial tear; s/p repair     Past Surgical History:   Procedure Laterality Date    HX CHOLECYSTECTOMY  6/2012    HX COLONOSCOPY      multiple    HX OOPHORECTOMY      HX ORTHOPAEDIC Left Aug 2007    posterior tibial tendon debridement and repair, calcaneal osteotomy    HX ORTHOPAEDIC  June 2008    removal of hardware, left heel    [place] BREAST SURGERY PROCEDURE UNLISTED      Right partial mastectomy     Current Outpatient Medications   Medication Sig    LYSINE PO Take  by mouth.  diclofenac (VOLTAREN) 1 % gel Apply  to affected area two (2) times a day. Apply to affected area as directed.  dicyclomine (BENTYL) 10 mg capsule     nitrofurantoin, macrocrystal-monohydrate, (MACROBID) 100 mg capsule     tobramycin-dexamethasone (TOBRADEX) ophthalmic suspension     escitalopram oxalate (LEXAPRO) 10 mg tablet Take 10 mg by mouth daily.  dronabinol (MARINOL) 5 mg capsule Take 5 mg by mouth two (2) times a day.  amitriptyline (ELAVIL) 25 mg tablet Take 1 Tab by mouth daily as needed.  diclofenac (VOLTAREN) 1 % topical gel Apply 4 g to affected area four (4) times daily.  SUMAtriptan (IMITREX) 50 mg tablet Take 50 mg by mouth once as needed. Indications: MIGRAINE    multivitamin (ONE A DAY) tablet Take 1 Tab by mouth daily.  B.infantis-B.ani-B.long-B.bifi (PROBIOTIC 4X) 10-15 mg Tab Take  by mouth.  cyanocobalamin (VITAMIN B-12) 1,000 mcg Subl 3,000 mcg by SubLINGual route.       Cholecalciferol, Vitamin D3, (VITAMIN D3) 1,000 unit cap Take 1,000 Units by mouth daily.  raloxifene (EVISTA) 60 mg tablet Take 60 mg by mouth daily. Indications: POST-MENOPAUSAL OSTEOPOROSIS PREVENTION    melatonin 3 mg tablet Take 3 mg by mouth nightly.  budesonide (ENTOCORT EC) 3 mg capsule  (Patient not taking: Reported on 10/4/2021)     No current facility-administered medications for this visit. Allergies   Allergen Reactions    Codeine Swelling    Percocet [Oxycodone-Acetaminophen] Swelling    Sulfa (Sulfonamide Antibiotics) Nausea Only    Ultram [Tramadol] Nausea and Vomiting    Vicodin [Hydrocodone-Acetaminophen] Swelling     Social History     Occupational History    Not on file   Tobacco Use    Smoking status: Never Smoker    Smokeless tobacco: Never Used   Substance and Sexual Activity    Alcohol use: No     Comment: rarely    Drug use: No    Sexual activity: Not on file     Family History   Problem Relation Age of Onset    Arthritis-osteo Mother     Hypertension Mother         DIAGNOSTIC LAB DATA      No results found for: HBA1C, PJA7QTEQ, GXT0KRDU //   Lab Results   Component Value Date/Time    Glucose 87 10/22/2009 09:18 AM    Glucose, POC 83 10/19/2012 12:58 PM        No results found for: OEZ8JBCB, HJB9SBXB      Lab Results   Component Value Date/Time    Vitamin D 25-Hydroxy 41 10/22/2009 09:18 AM        Drug Screen Most Recent Result Date    No resulted procedures found. REVIEW OF SYSTEMS : 10/26/2021  ALL BELOW ARE Negative except : SEE HPI     All other systems reviewed and are negative. 12 point review of systems otherwise negative unless noted in HPI. RADIOGRAPHS// IMAGING//DIAGNOSTIC DATA     No X ray's were obtained today     I have personally reviewed the images of the above study.  The interpretation of this study is my professional opinion            On this date 10/26/2021 I have spent 25 minutes reviewing previous notes, test results and face to face with the patient discussing the diagnosis and importance of compliance with the treatment plan as well as documenting on the day of the visit. Disclaimer:     Sections of this note are dictated using utilizing voice recognition software, which may have resulted in some phonetic based errors in grammar and contents. Even though attempts were made to correct all the mistakes, some may have been missed, and remained in the body of the document. If questions arise, please contact our department. An electronic signature was used to authenticate this note. Arline Jain may have a reminder for a \"due or due soon\" health maintenance. I have asked that she contact her primary care provider for follow-up on this health maintenance. There are no Patient Instructions on file for this visit. Please follow up with your PCP for any health maintenance as recommended       Earnest Cronin, as dictated byTyler.   10/26/2021  1:22 PM

## 2021-10-26 ENCOUNTER — OFFICE VISIT (OUTPATIENT)
Dept: ORTHOPEDIC SURGERY | Age: 66
End: 2021-10-26
Payer: OTHER GOVERNMENT

## 2021-10-26 VITALS
TEMPERATURE: 97.1 F | HEART RATE: 105 BPM | OXYGEN SATURATION: 100 % | RESPIRATION RATE: 16 BRPM | BODY MASS INDEX: 24.92 KG/M2 | WEIGHT: 146 LBS | HEIGHT: 64 IN

## 2021-10-26 DIAGNOSIS — M72.2 PLANTAR FASCIA SYNDROME: Primary | ICD-10-CM

## 2021-10-26 PROCEDURE — 99213 OFFICE O/P EST LOW 20 MIN: CPT | Performed by: ORTHOPAEDIC SURGERY

## 2021-11-08 ENCOUNTER — OFFICE VISIT (OUTPATIENT)
Dept: ORTHOPEDIC SURGERY | Age: 66
End: 2021-11-08
Payer: OTHER GOVERNMENT

## 2021-11-08 VITALS
BODY MASS INDEX: 25.06 KG/M2 | OXYGEN SATURATION: 97 % | TEMPERATURE: 97.5 F | HEART RATE: 97 BPM | WEIGHT: 146.8 LBS | HEIGHT: 64 IN

## 2021-11-08 DIAGNOSIS — M76.822 POSTERIOR TIBIALIS TENDINITIS OF BOTH LOWER EXTREMITIES: ICD-10-CM

## 2021-11-08 DIAGNOSIS — M21.42 PES PLANUS OF BOTH FEET: ICD-10-CM

## 2021-11-08 DIAGNOSIS — M76.821 POSTERIOR TIBIALIS TENDINITIS OF BOTH LOWER EXTREMITIES: ICD-10-CM

## 2021-11-08 DIAGNOSIS — M72.2 PLANTAR FASCIA SYNDROME: Primary | ICD-10-CM

## 2021-11-08 DIAGNOSIS — M21.41 PES PLANUS OF BOTH FEET: ICD-10-CM

## 2021-11-08 PROCEDURE — 99213 OFFICE O/P EST LOW 20 MIN: CPT | Performed by: ORTHOPAEDIC SURGERY

## 2021-11-08 NOTE — PROGRESS NOTES
AMBULATORY PROGRESS NOTE      Patient: Arline Jain             MRN: [id number]     SSN: [id number] Body mass index is 25.2 kg/m². YOB: 1955     AGE: 61 y.o. EX: female    PCP: Gab Ray MD       IMPRESSION //  DIAGNOSIS AND TREATMENT PLAN        Arline Jain has a diagnosis of:        DIAGNOSES    1. Plantar fascia syndrome    2. Posterior tibialis tendinitis of both lower extremities    3. Pes planus of both feet        No orders of the defined types were placed in this encounter. PLAN:    1. Advise patient to use her new orthotic      RTO: 12 /7/2021    There are no Patient Instructions on file for this visit. Please follow up with your PCP for any health maintenance as recommended         Arline Jain  expresses understanding of the diagnosis, treatment plan, and all of their proposed questions were answered to their satisfaction. Patient education has been provided re the diagnoses. HPI //  [address] IS A 61 y.o. female who is a/an  established patient, presenting to my outpatient office for evaluation of  the following chief complaint(s):     Chief Complaint   Patient presents with    Foot Pain     Left foot     At [address] [facility] presented w/ left ankle pain. Instructed pt to do HEP. Since LOV presents today w/ minimal left foot pain. She states she did cryotherapy and use elastic heel in her shoe, which alleviated her left foot pain. Visit Vitals  Pulse 97   Temp 97.5 °F (36.4 °C) (Temporal)   Ht 5' 4\" (1.626 m)   Wt 146 lb 12.8 oz (66.6 kg)   LMP 04/18/2002   SpO2 97%   BMI 25.20 kg/m²       Appearance: Alert, well appearing and pleasant patient who is in no distress, oriented to person, place/time, and who follows commands. Normal dress/motor activity/thought processes/memory. This patient is accompanied in the examination room by her  self.  There is signs of: no dementia  Patient arrives to office via: with assistive device: elastic heel  Psychiatric:  Normal Affect/mood. Judgement, behavior, and conduct are appropriate. Speech normal in context and clarity, memory intact grossly, no involuntary movements - tremors. H EENT (2): Head normocephalic & atraumatic. Eye: pupils are round// EOM are intact // Neck: ROM WNL  // Hearings Intact   Respiratory: Breathing non labored     ANKLE/FOOT left    Gait: normal  Tenderness: no       Cutaneous:   WNL. Joint Motion:   WNL   Joint / Tendon Stability: No Ankle or Subtalar instability or joint laxity. No peroneal sublux ability or dislocation  Alignment: neutral Hindfoot,    Neuro Motor/Sensory: NL/NL  Vascular: NL foot/ankle pulses,   Lymphatics: No extremity lymphedema, No calf swelling, no tenderness to calf muscles. CHART REVIEW     Maxx Evans has been experiencing pain and discomfort confirmed as outlined in the pain assessment outlined below.  was reviewed by Harjinder Rogel MD on 11/8/2021. Pain Assessment  11/8/2021   Location of Pain Foot   Location Modifiers Left   Severity of Pain 3   Quality of Pain Dull;Aching   Duration of Pain A few minutes   Duration of Pain Comment Only when on her foot   Frequency of Pain Intermittent   Date Pain First Started -   Date Pain First Started Comment -   Aggravating Factors Standing;Walking   Limiting Behavior Some   Relieving Factors Ice;Rest   Result of Injury No   Work-Related Injury -   Type of Injury -        Maxx Evans  has a past medical history of Appetite loss, Cancer ([facility] [address] (2001), Chronic pain, Insomnia, Left foot pain (Oct 2013), Migraines, Nausea, Osteopenia, Other ill-defined conditions(799.89), and Posterior tibial tendonitis (2007).      Patients is employed at:         Past Medical History:   Diagnosis Date    Appetite loss     Cancer [facility]) 2001    Hx of Right breast cancer    Chronic pain     Timo legs    Insomnia     Left foot pain Oct 2013 left flexor hallucis longus tendonitits    Migraines     Nausea     Osteopenia     Other ill-defined conditions(799.89)     very anxious    Posterior tibial tendonitis 2007    left, with interstitial tear; s/p repair     Past Surgical History:   Procedure Laterality Date    HX CHOLECYSTECTOMY  6/2012    HX COLONOSCOPY      multiple    HX OOPHORECTOMY      HX ORTHOPAEDIC Left Aug 2007    posterior tibial tendon debridement and repair, calcaneal osteotomy    HX ORTHOPAEDIC  June 2008    removal of hardware, left heel    [place] BREAST SURGERY PROCEDURE UNLISTED      Right partial mastectomy     Current Outpatient Medications   Medication Sig    LYSINE PO Take  by mouth.  dicyclomine (BENTYL) 10 mg capsule     escitalopram oxalate (LEXAPRO) 10 mg tablet Take 10 mg by mouth daily.  budesonide (ENTOCORT EC) 3 mg capsule     dronabinol (MARINOL) 5 mg capsule Take 5 mg by mouth two (2) times a day.  amitriptyline (ELAVIL) 25 mg tablet Take 1 Tab by mouth daily as needed.  SUMAtriptan (IMITREX) 50 mg tablet Take 50 mg by mouth once as needed. Indications: MIGRAINE    multivitamin (ONE A DAY) tablet Take 1 Tab by mouth daily.  B.infantis-B.ani-B.long-B.bifi (PROBIOTIC 4X) 10-15 mg Tab Take  by mouth.  cyanocobalamin (VITAMIN B-12) 1,000 mcg Subl 3,000 mcg by SubLINGual route.  Cholecalciferol, Vitamin D3, (VITAMIN D3) 1,000 unit cap Take 1,000 Units by mouth daily.  raloxifene (EVISTA) 60 mg tablet Take 60 mg by mouth daily. Indications: POST-MENOPAUSAL OSTEOPOROSIS PREVENTION    melatonin 3 mg tablet Take 3 mg by mouth nightly.  diclofenac (VOLTAREN) 1 % gel Apply  to affected area two (2) times a day. Apply to affected area as directed. (Patient not taking: Reported on 11/8/2021)    diclofenac (VOLTAREN) 1 % topical gel Apply 4 g to affected area four (4) times daily.  (Patient not taking: Reported on 11/8/2021)     No current facility-administered medications for this visit. Allergies   Allergen Reactions    Codeine Swelling    Percocet [Oxycodone-Acetaminophen] Swelling    Sulfa (Sulfonamide Antibiotics) Nausea Only    Ultram [Tramadol] Nausea and Vomiting    Vicodin [Hydrocodone-Acetaminophen] Swelling     Social History     Occupational History    Not on file   Tobacco Use    Smoking status: Never Smoker    Smokeless tobacco: Never Used   Substance and Sexual Activity    Alcohol use: No     Comment: rarely    Drug use: No    Sexual activity: Not on file     Family History   Problem Relation Age of Onset    Arthritis-osteo Mother     Hypertension Mother         DIAGNOSTIC LAB DATA      No results found for: HBA1C, [id number], [id number] //   Lab Results   Component Value Date/Time    Glucose 87 10/22/2009 09:18 AM    Glucose, POC 83 10/19/2012 12:58 PM        No results found for: JPR3FTUL, [id number]      Lab Results   Component Value Date/Time    Vitamin D 25-Hydroxy 41 10/22/2009 09:18 AM        Drug Screen Most Recent Result Date    No resulted procedures found. REVIEW OF SYSTEMS : 11/8/2021  ALL BELOW ARE Negative except : SEE HPI     All other systems reviewed and are negative. 12 point review of systems otherwise negative unless noted in HPI. RADIOGRAPHS// IMAGING//DIAGNOSTIC DATA     No orders of the defined types were placed in this encounter. I have personally reviewed the images of the above study. The interpretation of this study is my professional opinion            On this date 11/08/2021 I have spent 22 minutes reviewing previous notes, test results and face to face with the patient discussing the diagnosis and importance of compliance with the treatment plan as well as documenting on the day of the visit. Disclaimer:     Sections of this note are dictated using utilizing voice recognition software, which may have resulted in some phonetic based errors in grammar and contents.  Even though attempts were made to correct all the mistakes, some may have been missed, and remained in the body of the document. If questions arise, please contact our department. An electronic signature was used to authenticate this note. Lino Delaney may have a reminder for a \"due or due soon\" health maintenance. I have asked that she contact her primary care provider for follow-up on this health maintenance. There are no Patient Instructions on file for this visit.         Please follow up with your PCP for any health maintenance as recommended         Elke Mcgrath, as dictated by, Nicole Vargas  11/8/2021  11:02 AM

## 2021-12-06 NOTE — PROGRESS NOTES
[facility] AMBULATORY PROGRESS NOTE      Patient: Neville Galeazzi             MRN: [id number]     SSN: [id number] Body mass index is 26.78 kg/m². YOB: 1955     AGE: 61 y.o. EX: female    PCP: Alina Royal MD       IMPRESSION //  DIAGNOSIS AND TREATMENT PLAN        Neville Galeazzi has a diagnosis of:      She is doing reasonably well, with the custom orthotics have not been providing [employer] at [facility] [employer] products of prosthetics, she has had this in her shoes at this current time each shoe, for the last month or so now. I will keep her in her fiancé in my prayers as she states her fiancé has been diagnosed with a pancreatic mass for which he needs surgical invention, and he remains under the care of the [facility] endocrine/surgical services. DIAGNOSES    1. Posterior tibialis tendinitis of both lower extremities    2. Pes planus of both feet    3. Plantar fascia syndrome        No orders of the defined types were placed in this encounter. PLAN:    1. Continue wearing Custom Trilaminar orthotics      RTO 2 to 3 months or prn    There are no Patient Instructions on file for this visit. Please follow up with your PCP for any health maintenance as recommended         Neville Galeazzi  expresses understanding of the diagnosis, treatment plan, and all of their proposed questions were answered to their satisfaction. Patient education has been provided re the diagnoses. HPI //  [address] IS A 61 y.o. female who is a/an  established patient, presenting to my outpatient office for evaluation of  the following chief complaint(s):     Chief Complaint   Patient presents with    Foot Pain     left       At [address] [facility] presented w/ left foot pain. Advise patient to use her new orthotic. Since LOV Neville Galeazzi continues to endorse some left foot pain.  She states she is still not comfortable with returning to [place] [address] because she still has some soreness in the afternoon time. She mentions she has had her orthotics for about a month. She is taking a cruise to [place] in the near future. Visit Vitals  Pulse 90   Temp 97.6 °F (36.4 °C) (Temporal)   Wt 156 lb (70.8 kg)   LMP 04/18/2002   SpO2 99%   BMI 26.78 kg/m²       Appearance: Alert, well appearing and pleasant patient who is in no distress, oriented to person, place/time, and who follows commands. Normal dress/motor activity/thought processes/memory. This patient is accompanied in the examination room by her  self. Patient arrives to office via: with assistive device: Custom Trilaminar Orthotics    Psychiatric:  Normal Affect/mood. Judgement, behavior, and conduct are appropriate. Speech normal in context and clarity, memory intact grossly, no involuntary movements - tremors. H EENT (2): Head normocephalic & atraumatic. Eye: pupils are round// EOM are intact // Neck: ROM WNL  // Hearings Intact   Respiratory: Breathing non labored     ANKLE/FOOT left    Gait: uses assistive device  Custom Trilaminar Orthotics  Tenderness: no       Cutaneous:   WNL. Joint Motion:   WNL   Joint / Tendon Stability:  No Ankle or Subtalar instability or joint laxity. No peroneal sublux ability or dislocation  Alignment: neutral Hindfoot,    Neuro Motor/Sensory: NL/NL  Vascular: NL foot/ankle pulses,   Lymphatics: No extremity lymphedema, No calf swelling, no tenderness to calf muscles. CHART REVIEW     Sheldon Murillo has been experiencing pain and discomfort confirmed as outlined in the pain assessment outlined below.  was reviewed by  Deidra Hall MD on 12/7/2021.      Pain Assessment  12/7/2021   Location of Pain Ankle   Location Modifiers Left   Severity of Pain 5   Quality of Pain Aching   Duration of Pain Persistent   Duration of Pain Comment -   Frequency of Pain Intermittent   Date Pain First Started -   Date Pain First Started Comment - Aggravating Factors Walking;Stairs;Standing;Squatting;Kneeling   Limiting Behavior Yes   Relieving Factors Rest;Ice;Elevation   Result of Injury No   Work-Related Injury -   Type of Injury -        Amanda Loyd  has a past medical history of Appetite loss, Cancer ([address] (2001), Chronic pain, Insomnia, Left foot pain (Oct 2013), Migraines, Nausea, Osteopenia, Other ill-defined conditions(799.89), and Posterior tibial tendonitis (2007). Patients is employed at:         Past Medical History:   Diagnosis Date    Appetite loss     Cancer [facility] 2001    Hx of Right breast cancer    Chronic pain     Timo legs    Insomnia     Left foot pain Oct 2013    left flexor hallucis longus tendonitits    Migraines     Nausea     Osteopenia     Other ill-defined conditions(799.89)     very anxious    Posterior tibial tendonitis 2007    left, with interstitial tear; s/p repair     Past Surgical History:   Procedure Laterality Date    HX CHOLECYSTECTOMY  6/2012    HX COLONOSCOPY      multiple    HX OOPHORECTOMY      HX ORTHOPAEDIC Left Aug 2007    posterior tibial tendon debridement and repair, calcaneal osteotomy    HX ORTHOPAEDIC  June 2008    removal of hardware, left heel    VA BREAST SURGERY PROCEDURE UNLISTED      Right partial mastectomy     Current Outpatient Medications   Medication Sig    LYSINE PO Take  by mouth.  diclofenac (VOLTAREN) 1 % gel Apply  to affected area two (2) times a day. Apply to affected area as directed.  dicyclomine (BENTYL) 10 mg capsule     escitalopram oxalate (LEXAPRO) 10 mg tablet Take 10 mg by mouth daily.  budesonide (ENTOCORT EC) 3 mg capsule     dronabinol (MARINOL) 5 mg capsule Take 5 mg by mouth two (2) times a day.  amitriptyline (ELAVIL) 25 mg tablet Take 1 Tab by mouth daily as needed.  diclofenac (VOLTAREN) 1 % topical gel Apply 4 g to affected area four (4) times daily.  SUMAtriptan (IMITREX) 50 mg tablet Take 50 mg by mouth once as needed. Indications: MIGRAINE    multivitamin (ONE A DAY) tablet Take 1 Tab by mouth daily.  B.infantis-B.ani-B.long-B.bifi (PROBIOTIC 4X) 10-15 mg Tab Take  by mouth.  cyanocobalamin (VITAMIN B-12) 1,000 mcg Subl 3,000 mcg by SubLINGual route.  Cholecalciferol, Vitamin D3, (VITAMIN D3) 1,000 unit cap Take 1,000 Units by mouth daily.  raloxifene (EVISTA) 60 mg tablet Take 60 mg by mouth daily. Indications: POST-MENOPAUSAL OSTEOPOROSIS PREVENTION    melatonin 3 mg tablet Take 3 mg by mouth nightly. No current facility-administered medications for this visit. Allergies   Allergen Reactions    Codeine Swelling    Percocet [Oxycodone-Acetaminophen] Swelling    Sulfa (Sulfonamide Antibiotics) Nausea Only    Ultram [Tramadol] Nausea and Vomiting    Vicodin [Hydrocodone-Acetaminophen] Swelling     Social History     Occupational History    Not on file   Tobacco Use    Smoking status: Never Smoker    Smokeless tobacco: Never Used   Substance and Sexual Activity    Alcohol use: No     Comment: rarely    Drug use: No    Sexual activity: Not on file     Family History   Problem Relation Age of Onset    Arthritis-osteo Mother     Hypertension Mother         DIAGNOSTIC LAB DATA      No results found for: HBA1C, FLW9AZWO, [id number] //   Lab Results   Component Value Date/Time    Glucose 87 10/22/2009 09:18 AM    Glucose, POC 83 10/19/2012 12:58 PM        No results found for: NZX7NMBJ, [id number]      Lab Results   Component Value Date/Time    Vitamin D 25-Hydroxy 41 10/22/2009 09:18 AM        Drug Screen Most Recent Result Date    No resulted procedures found. REVIEW OF SYSTEMS : 12/7/2021  ALL BELOW ARE Negative except : SEE HPI     All other systems reviewed and are negative. 12 point review of systems otherwise negative unless noted in HPI. RADIOGRAPHS// IMAGING//DIAGNOSTIC DATA     No orders of the defined types were placed in this encounter.         I have personally reviewed the images of the above study. The interpretation of this study is my professional opinion            I have spent 20 minutes reviewing the previous notes, reviewing diagnostic studies [Advanced  Imaging, Diagnostic test results (x-rays)] and had a direct face to face with the patient discussing the diagnosis and importance of compliance with the treatment and plan. There is  discussion for the potential for surgery, answering all questions, as well as documenting patient care coordination for this individual on the day of the visit. Disclaimer:     Sections of this note are dictated using utilizing voice recognition software, which may have resulted in some phonetic based errors in grammar and contents. Even though attempts were made to correct all the mistakes, some may have been missed, and remained in the body of the document. If questions arise, please contact our department. An electronic signature was used to authenticate this note. Yamileth Shay may have a reminder for a \"due or due soon\" health maintenance. I have asked that she contact her primary care provider for follow-up on this health maintenance. There are no Patient Instructions on file for this visit. Please follow up with your PCP for any health maintenance as recommended.               Linda Heard,as dictated by, Alan Gudino  12/7/2021  8:48 AM

## 2021-12-07 ENCOUNTER — OFFICE VISIT (OUTPATIENT)
Dept: ORTHOPEDIC SURGERY | Age: 66
End: 2021-12-07
Payer: OTHER GOVERNMENT

## 2021-12-07 VITALS — TEMPERATURE: 97.6 F | OXYGEN SATURATION: 99 % | WEIGHT: 156 LBS | HEART RATE: 90 BPM | BODY MASS INDEX: 26.78 KG/M2

## 2021-12-07 DIAGNOSIS — M21.41 PES PLANUS OF BOTH FEET: ICD-10-CM

## 2021-12-07 DIAGNOSIS — M21.42 PES PLANUS OF BOTH FEET: ICD-10-CM

## 2021-12-07 DIAGNOSIS — M72.2 PLANTAR FASCIA SYNDROME: ICD-10-CM

## 2021-12-07 DIAGNOSIS — M76.821 POSTERIOR TIBIALIS TENDINITIS OF BOTH LOWER EXTREMITIES: Primary | ICD-10-CM

## 2021-12-07 DIAGNOSIS — M76.822 POSTERIOR TIBIALIS TENDINITIS OF BOTH LOWER EXTREMITIES: Primary | ICD-10-CM

## 2021-12-07 PROCEDURE — 99213 OFFICE O/P EST LOW 20 MIN: CPT | Performed by: ORTHOPAEDIC SURGERY

## 2023-10-23 ENCOUNTER — OFFICE VISIT (OUTPATIENT)
Age: 68
End: 2023-10-23

## 2023-10-23 DIAGNOSIS — M76.821 POSTERIOR TIBIAL TENDINITIS, RIGHT LEG: ICD-10-CM

## 2023-10-23 DIAGNOSIS — S99.922A INJURY OF TOE ON LEFT FOOT, INITIAL ENCOUNTER: Primary | ICD-10-CM

## 2023-10-23 DIAGNOSIS — M76.822 POSTERIOR TIBIAL TENDINITIS, LEFT LEG: ICD-10-CM

## 2023-10-23 NOTE — PROGRESS NOTES
redness to the 5th toe  Joint Motion: ankle/hindfoot WNL   Joint / Tendon Stability:  No Ankle or Subtalar instability or joint laxity. No peroneal sublux ability or dislocation  Alignment: neutral Hindfoot,    Neuro Motor/Sensory: NL/NL  Vascular: NL foot/ankle pulses,   Lymphatics: No extremity lymphedema, No calf swelling, no tenderness to calf muscles. RADIOGRAPHS// IMAGING//DIAGNOSTIC DATA     Orders Placed This Encounter   Procedures    [89759] Foot Min 3V    DME Order for (Specify) as OP     - CUSTOM DME device ordered -  Bilateral TRILAMIMINAR ORTHOTIC WITH MET PAD  - Diagnosis: Injury of toe on left foot, initial encounter  (primary encounter diagnosis)  Plan: [23307] Foot Min 3V           Custom trilaminar orthotic, with metatarsal pad, but also offload central heel, bilateral  - Length of Need: Lifetime    This item, is of medical necessity, in order to offload her corresponding plantar forefoot metatarsal heads for the diagnosis of metatarsalgia, plantar plate inflammation, plantar synovitis, and abnormal plantar forefoot forces that can create plantar forefoot calluses. By improving the biomechanics, and offloading the forefoot, this custom orthotic will improve gait, lessen pain, increase the time duration of standing and walking. .    JALEN Cunha MD  10/23/2023 10:46 AM             LEFT FOOT X-rays, NWB 3 views, AP/LAT/OBL completed 10/23/2023 AT [facility]    X-rays reveal Osseous: On close inspection there does appear to be healing fracture to the fifth toe proximal phalanx, there are no dislocation or subluxation noted. Overall alignment is yes acceptable. Soft tissue swelling is there is mild noted. No osteolytic or osteoblastic lesions noted. Mineralization suggests no osteopenia. Degenerative changes are not noted. Calcified vessels are not present. I have personally reviewed the images of the above study.  The interpretation of this study is my

## 2025-06-24 ENCOUNTER — OFFICE VISIT (OUTPATIENT)
Age: 70
End: 2025-06-24
Payer: OTHER GOVERNMENT

## 2025-06-24 DIAGNOSIS — M76.821 POSTERIOR TIBIAL TENDINITIS, RIGHT LEG: ICD-10-CM

## 2025-06-24 DIAGNOSIS — M76.822 POSTERIOR TIBIAL TENDINITIS, LEFT LEG: Primary | ICD-10-CM

## 2025-06-24 PROCEDURE — 73610 X-RAY EXAM OF ANKLE: CPT | Performed by: ORTHOPAEDIC SURGERY

## 2025-06-24 PROCEDURE — 1123F ACP DISCUSS/DSCN MKR DOCD: CPT | Performed by: ORTHOPAEDIC SURGERY

## 2025-06-24 PROCEDURE — 99213 OFFICE O/P EST LOW 20 MIN: CPT | Performed by: ORTHOPAEDIC SURGERY

## 2025-06-24 NOTE — PROGRESS NOTES
tablet by mouth   • raloxifene (EVISTA) 60 MG tablet Take 1 tablet by mouth daily   • SUMAtriptan (IMITREX) 50 MG tablet Take 1 tablet by mouth once as needed     No current facility-administered medications for this visit.        DIAGNOSTIC LAB DATA      XR HIP 2-3 VW W PELVIS RIGHT 04/10/2018    Narrative  Hip    History: Right hip pain. No trauma.    Comparison: 1/15/2014    Technique: A.P. [radiograph of the pelvis and frog lateral view of the right hip  are reviewed].    Findings:    No evidence of acute fracture or dislocation of the hip.  Mild to moderate  degenerative changes are seen at the bilateral hips. Mild SI joint degenerative  changes. Limited evaluation of the sacrum secondary to overlying bowel content.  The visualized regions of the pelvis and sacrum are intact.    Impression  Impression:    Mild to moderate degenerative changes of the bilateral hips. This is grossly  stable from 2014.    Thank you for this referral.     No results found for this or any previous visit (from the past 4464 hours).      No results found for: \"WBC\", \"HGB\", \"HCT\", \"MCV\", \"PLT\", \"MID\", \"GRAN\", \"LYMPHOPCT\", \"MIDPERCENT\", \"GRANULOCYTES\", \"RBC\", \"MCH\", \"MCHC\", \"RDW\"  No results found for: \"LABA1C\",  No results found for: \"NA\", \"K\", \"CL\", \"CO2\", \"BUN\", \"CREATININE\", \"GLUCOSE\", \"CALCIUM\", \"LABALBU\", \"BILITOT\", \"ALKPHOS\", \"AST\", \"ALT\", \"LABGLOM\", \"GFRAA\", \"AGRATIO\", \"GLOB\"  No results found for: \"VITD25\" ,No results found for: \"INR\", \"PROTIME\", No results found for: \"APTT\"       REVIEW OF SYSTEMS : 6/24/2025  ALL BELOW ARE Negative except : SEE HPI     All other systems reviewed and are negative.     14 point review of systems otherwise negative unless noted in HPI.          I have spent 20 minutes reviewing the previous notes, reviewing diagnostic studies [Advanced  Imaging, Diagnostic test results (x-rays)] and had a direct face to face with the patient discussing the diagnosis and importance of compliance with the